# Patient Record
Sex: FEMALE | Race: OTHER | NOT HISPANIC OR LATINO | URBAN - METROPOLITAN AREA
[De-identification: names, ages, dates, MRNs, and addresses within clinical notes are randomized per-mention and may not be internally consistent; named-entity substitution may affect disease eponyms.]

---

## 2022-09-17 ENCOUNTER — INPATIENT (INPATIENT)
Facility: HOSPITAL | Age: 57
LOS: 3 days | Discharge: HOME | End: 2022-09-21
Attending: INTERNAL MEDICINE | Admitting: INTERNAL MEDICINE

## 2022-09-17 VITALS
RESPIRATION RATE: 18 BRPM | DIASTOLIC BLOOD PRESSURE: 68 MMHG | TEMPERATURE: 100 F | OXYGEN SATURATION: 100 % | SYSTOLIC BLOOD PRESSURE: 129 MMHG | HEART RATE: 94 BPM

## 2022-09-17 LAB
ALBUMIN SERPL ELPH-MCNC: 4 G/DL — SIGNIFICANT CHANGE UP (ref 3.3–5)
ALP SERPL-CCNC: 146 U/L — HIGH (ref 40–120)
ALT FLD-CCNC: 33 U/L — SIGNIFICANT CHANGE UP (ref 4–33)
ANION GAP SERPL CALC-SCNC: 21 MMOL/L — HIGH (ref 7–14)
APPEARANCE UR: CLEAR — SIGNIFICANT CHANGE UP
AST SERPL-CCNC: 46 U/L — HIGH (ref 4–32)
BACTERIA # UR AUTO: ABNORMAL
BASE EXCESS BLDV CALC-SCNC: -7.4 MMOL/L — LOW (ref -2–3)
BASOPHILS # BLD AUTO: 0.02 K/UL — SIGNIFICANT CHANGE UP (ref 0–0.2)
BASOPHILS NFR BLD AUTO: 0.1 % — SIGNIFICANT CHANGE UP (ref 0–2)
BILIRUB SERPL-MCNC: 0.3 MG/DL — SIGNIFICANT CHANGE UP (ref 0.2–1.2)
BILIRUB UR-MCNC: NEGATIVE — SIGNIFICANT CHANGE UP
BUN SERPL-MCNC: 25 MG/DL — HIGH (ref 7–23)
CA-I SERPL-SCNC: 1.2 MMOL/L — SIGNIFICANT CHANGE UP (ref 1.15–1.33)
CALCIUM SERPL-MCNC: 10 MG/DL — SIGNIFICANT CHANGE UP (ref 8.4–10.5)
CHLORIDE BLDV-SCNC: 94 MMOL/L — LOW (ref 96–108)
CHLORIDE SERPL-SCNC: 88 MMOL/L — LOW (ref 98–107)
CHLORIDE UR-SCNC: <20 MMOL/L — SIGNIFICANT CHANGE UP
CO2 BLDV-SCNC: 20 MMOL/L — LOW (ref 22–26)
CO2 SERPL-SCNC: 16 MMOL/L — LOW (ref 22–31)
COLOR SPEC: SIGNIFICANT CHANGE UP
CREAT SERPL-MCNC: 0.87 MG/DL — SIGNIFICANT CHANGE UP (ref 0.5–1.3)
DIFF PNL FLD: ABNORMAL
EGFR: 77 ML/MIN/1.73M2 — SIGNIFICANT CHANGE UP
EOSINOPHIL # BLD AUTO: 0.01 K/UL — SIGNIFICANT CHANGE UP (ref 0–0.5)
EOSINOPHIL NFR BLD AUTO: 0.1 % — SIGNIFICANT CHANGE UP (ref 0–6)
EPI CELLS # UR: 0 /HPF — SIGNIFICANT CHANGE UP (ref 0–5)
FLUAV AG NPH QL: SIGNIFICANT CHANGE UP
FLUBV AG NPH QL: SIGNIFICANT CHANGE UP
GAS PNL BLDV: 125 MMOL/L — LOW (ref 136–145)
GAS PNL BLDV: SIGNIFICANT CHANGE UP
GAS PNL BLDV: SIGNIFICANT CHANGE UP
GLUCOSE BLDV-MCNC: 134 MG/DL — HIGH (ref 70–99)
GLUCOSE SERPL-MCNC: 169 MG/DL — HIGH (ref 70–99)
GLUCOSE UR QL: ABNORMAL
HCO3 BLDV-SCNC: 19 MMOL/L — LOW (ref 22–29)
HCT VFR BLD CALC: 34 % — LOW (ref 34.5–45)
HCT VFR BLDA CALC: 32 % — LOW (ref 34.5–46.5)
HGB BLD CALC-MCNC: 10.7 G/DL — LOW (ref 11.5–15.5)
HGB BLD-MCNC: 11.1 G/DL — LOW (ref 11.5–15.5)
HYALINE CASTS # UR AUTO: 2 /LPF — SIGNIFICANT CHANGE UP (ref 0–7)
IANC: 11.47 K/UL — HIGH (ref 1.8–7.4)
IMM GRANULOCYTES NFR BLD AUTO: 0.3 % — SIGNIFICANT CHANGE UP (ref 0–0.9)
KETONES UR-MCNC: ABNORMAL
LACTATE BLDV-MCNC: 1.5 MMOL/L — SIGNIFICANT CHANGE UP (ref 0.5–2)
LEUKOCYTE ESTERASE UR-ACNC: ABNORMAL
LIDOCAIN IGE QN: 58 U/L — SIGNIFICANT CHANGE UP (ref 7–60)
LYMPHOCYTES # BLD AUTO: 1.57 K/UL — SIGNIFICANT CHANGE UP (ref 1–3.3)
LYMPHOCYTES # BLD AUTO: 10.9 % — LOW (ref 13–44)
MCHC RBC-ENTMCNC: 25 PG — LOW (ref 27–34)
MCHC RBC-ENTMCNC: 32.6 GM/DL — SIGNIFICANT CHANGE UP (ref 32–36)
MCV RBC AUTO: 76.6 FL — LOW (ref 80–100)
MONOCYTES # BLD AUTO: 1.35 K/UL — HIGH (ref 0–0.9)
MONOCYTES NFR BLD AUTO: 9.3 % — SIGNIFICANT CHANGE UP (ref 2–14)
NEUTROPHILS # BLD AUTO: 11.47 K/UL — HIGH (ref 1.8–7.4)
NEUTROPHILS NFR BLD AUTO: 79.3 % — HIGH (ref 43–77)
NITRITE UR-MCNC: POSITIVE
NRBC # BLD: 0 /100 WBCS — SIGNIFICANT CHANGE UP (ref 0–0)
NRBC # FLD: 0 K/UL — SIGNIFICANT CHANGE UP (ref 0–0)
OSMOLALITY UR: 416 MOSM/KG — SIGNIFICANT CHANGE UP (ref 50–1200)
PCO2 BLDV: 40 MMHG — SIGNIFICANT CHANGE UP (ref 39–42)
PH BLDV: 7.28 — LOW (ref 7.32–7.43)
PH UR: 6 — SIGNIFICANT CHANGE UP (ref 5–8)
PLATELET # BLD AUTO: 310 K/UL — SIGNIFICANT CHANGE UP (ref 150–400)
PO2 BLDV: 27 MMHG — SIGNIFICANT CHANGE UP
POTASSIUM BLDV-SCNC: 4.1 MMOL/L — SIGNIFICANT CHANGE UP (ref 3.5–5.1)
POTASSIUM SERPL-MCNC: 4.5 MMOL/L — SIGNIFICANT CHANGE UP (ref 3.5–5.3)
POTASSIUM SERPL-SCNC: 4.5 MMOL/L — SIGNIFICANT CHANGE UP (ref 3.5–5.3)
POTASSIUM UR-SCNC: 21.5 MMOL/L — SIGNIFICANT CHANGE UP
PROT SERPL-MCNC: 8.4 G/DL — HIGH (ref 6–8.3)
PROT UR-MCNC: ABNORMAL
RBC # BLD: 4.44 M/UL — SIGNIFICANT CHANGE UP (ref 3.8–5.2)
RBC # FLD: 13.4 % — SIGNIFICANT CHANGE UP (ref 10.3–14.5)
RBC CASTS # UR COMP ASSIST: 17 /HPF — HIGH (ref 0–4)
RSV RNA NPH QL NAA+NON-PROBE: SIGNIFICANT CHANGE UP
SAO2 % BLDV: 36.3 % — SIGNIFICANT CHANGE UP
SARS-COV-2 RNA SPEC QL NAA+PROBE: SIGNIFICANT CHANGE UP
SODIUM SERPL-SCNC: 125 MMOL/L — LOW (ref 135–145)
SODIUM UR-SCNC: <20 MMOL/L — SIGNIFICANT CHANGE UP
SP GR SPEC: 1.02 — SIGNIFICANT CHANGE UP (ref 1.01–1.05)
TROPONIN T, HIGH SENSITIVITY RESULT: 11 NG/L — SIGNIFICANT CHANGE UP
TROPONIN T, HIGH SENSITIVITY RESULT: 8 NG/L — SIGNIFICANT CHANGE UP
UROBILINOGEN FLD QL: SIGNIFICANT CHANGE UP
WBC # BLD: 14.46 K/UL — HIGH (ref 3.8–10.5)
WBC # FLD AUTO: 14.46 K/UL — HIGH (ref 3.8–10.5)
WBC UR QL: 23 /HPF — HIGH (ref 0–5)

## 2022-09-17 PROCEDURE — 71046 X-RAY EXAM CHEST 2 VIEWS: CPT | Mod: 26

## 2022-09-17 PROCEDURE — 99285 EMERGENCY DEPT VISIT HI MDM: CPT

## 2022-09-17 RX ORDER — SODIUM CHLORIDE 9 MG/ML
1000 INJECTION INTRAMUSCULAR; INTRAVENOUS; SUBCUTANEOUS ONCE
Refills: 0 | Status: COMPLETED | OUTPATIENT
Start: 2022-09-17 | End: 2022-09-17

## 2022-09-17 RX ORDER — SODIUM CHLORIDE 9 MG/ML
1000 INJECTION, SOLUTION INTRAVENOUS ONCE
Refills: 0 | Status: COMPLETED | OUTPATIENT
Start: 2022-09-17 | End: 2022-09-17

## 2022-09-17 RX ORDER — FAMOTIDINE 10 MG/ML
20 INJECTION INTRAVENOUS ONCE
Refills: 0 | Status: COMPLETED | OUTPATIENT
Start: 2022-09-17 | End: 2022-09-17

## 2022-09-17 RX ORDER — ONDANSETRON 8 MG/1
4 TABLET, FILM COATED ORAL ONCE
Refills: 0 | Status: COMPLETED | OUTPATIENT
Start: 2022-09-17 | End: 2022-09-17

## 2022-09-17 RX ORDER — ACETAMINOPHEN 500 MG
975 TABLET ORAL ONCE
Refills: 0 | Status: COMPLETED | OUTPATIENT
Start: 2022-09-17 | End: 2022-09-17

## 2022-09-17 RX ORDER — GABAPENTIN 400 MG/1
100 CAPSULE ORAL ONCE
Refills: 0 | Status: COMPLETED | OUTPATIENT
Start: 2022-09-17 | End: 2022-09-17

## 2022-09-17 RX ORDER — LIDOCAINE 4 G/100G
10 CREAM TOPICAL ONCE
Refills: 0 | Status: COMPLETED | OUTPATIENT
Start: 2022-09-17 | End: 2022-09-17

## 2022-09-17 RX ORDER — SODIUM CHLORIDE 9 MG/ML
1000 INJECTION INTRAMUSCULAR; INTRAVENOUS; SUBCUTANEOUS ONCE
Refills: 0 | Status: DISCONTINUED | OUTPATIENT
Start: 2022-09-17 | End: 2022-09-17

## 2022-09-17 RX ADMIN — SODIUM CHLORIDE 1000 MILLILITER(S): 9 INJECTION INTRAMUSCULAR; INTRAVENOUS; SUBCUTANEOUS at 23:37

## 2022-09-17 RX ADMIN — SODIUM CHLORIDE 1000 MILLILITER(S): 9 INJECTION INTRAMUSCULAR; INTRAVENOUS; SUBCUTANEOUS at 20:34

## 2022-09-17 RX ADMIN — Medication 30 MILLILITER(S): at 20:33

## 2022-09-17 RX ADMIN — LIDOCAINE 10 MILLILITER(S): 4 CREAM TOPICAL at 20:33

## 2022-09-17 RX ADMIN — SODIUM CHLORIDE 1000 MILLILITER(S): 9 INJECTION, SOLUTION INTRAVENOUS at 23:23

## 2022-09-17 RX ADMIN — ONDANSETRON 4 MILLIGRAM(S): 8 TABLET, FILM COATED ORAL at 20:34

## 2022-09-17 RX ADMIN — GABAPENTIN 100 MILLIGRAM(S): 400 CAPSULE ORAL at 20:33

## 2022-09-17 RX ADMIN — Medication 975 MILLIGRAM(S): at 23:36

## 2022-09-17 RX ADMIN — FAMOTIDINE 20 MILLIGRAM(S): 10 INJECTION INTRAVENOUS at 20:34

## 2022-09-17 NOTE — ED PROVIDER NOTE - OBJECTIVE STATEMENT
Pt is 56 y/o female w/ PMH DM (on metoformin), CAD s/p stent x2 (on aspirin) presenting to ED c/o chills, pain in right leg, and stomach pain since yesterday. She states because of stomach pain, she was unable to normal amount of food and drink; reports difficulty swallowing, feeling of food stuck. Denies recent travel and sick contact. Denies fever, nausea, and diarrhea but admits to NBNB emesis x2 w/ last emesis at 8 am this morning.    Reports allergy to aspirin (hive). Denies smoking, drinking, and illicit drug usage.  Fam Hx significant for DM in father (50), and HTN in mother. Pt is 58 y/o female w/ PMH DM (on metformin), CAD s/p stent x2 (on aspirin) presenting to ED c/o chills, epigastric pain since yesterday. She states because of stomach pain, she was unable to normal amount of food and drink; reports difficulty swallowing, feeling of food stuck. Denies recent travel and sick contact. Denies fever, nausea, and diarrhea but admits to NBNB emesis x2 w/ last emesis at 8 am this morning.    Reports allergy to aspirin (hive). Denies smoking, drinking, and illicit drug usage.  Fam Hx significant for DM in father (50), and HTN in mother.

## 2022-09-17 NOTE — ED PROVIDER NOTE - PROGRESS NOTE DETAILS
Na 125, oral temp 100.5, UA consistent with UTI. treat pt with fluids, ceftriaxone. admit to hospitalist for sepsis w/u.

## 2022-09-17 NOTE — ED PROVIDER NOTE - CLINICAL SUMMARY MEDICAL DECISION MAKING FREE TEXT BOX
56 y/o female c/o chills, pain in right leg, and stomach pain since yesterday.    Concern for viral gastroenteritis.  Will order labs, including CBC, CMP, UA/UCx, and order COVID/RVP.  Will order CXR to r/o pneumonia.  Will provide symptomatic care PRN.  Will reassess. 56 y/o female c/o chills, pain in right leg, and stomach pain since yesterday.    Concern for viral gastroenteritis. given h/o stents, will get ACS w/u.  Will order labs, including CBC, CMP, UA/UCx, and order COVID/RVP.  Will order CXR to r/o pneumonia.  Will provide symptomatic care PRN.  Will reassess.

## 2022-09-17 NOTE — ED PROVIDER NOTE - ATTENDING APP SHARED VISIT CONTRIBUTION OF CARE
DR. VUONG, ATTENDING MD-  I personally saw the patient with the PA and performed a substantive portion of the visit including all aspects of the medical decision making.    56 y/o female h/o dm cardiac stent x2 p/w fatigue, "choking" sensation while eating, cp, n/v x2 days.  Eval for atypical acs, anemia, lyte abn, viral syndrome.  Obtain cbc cmp trop lipase ekg cxr ua ucx covid swab give ivf bolus antiemetic reassess.

## 2022-09-17 NOTE — ED ADULT TRIAGE NOTE - CHIEF COMPLAINT QUOTE
c/o midsternal c/p, chills, and episodes of vomiting, onset 2 days ago worse today, denies SOB, CULVER, hx of DM and 2 stents placement.

## 2022-09-17 NOTE — ED ADULT NURSE NOTE - OBJECTIVE STATEMENT
pt aox4, from home, comes in for persistent reflux like symptoms with sore throat, nausea, vomiting at home, with chills and generalized malaise with right leg pain. pt denies chest pain, no sob, no headache. no swelling to extremities. right hand20 g inserted with labs drawn. stretcher in lowest position.

## 2022-09-17 NOTE — ED PROVIDER NOTE - CARE PLAN
1 Principal Discharge DX:	Hyponatremia   Principal Discharge DX:	Hyponatremia  Secondary Diagnosis:	Acute UTI

## 2022-09-18 DIAGNOSIS — A41.9 SEPSIS, UNSPECIFIED ORGANISM: ICD-10-CM

## 2022-09-18 DIAGNOSIS — Z95.5 PRESENCE OF CORONARY ANGIOPLASTY IMPLANT AND GRAFT: Chronic | ICD-10-CM

## 2022-09-18 DIAGNOSIS — I25.10 ATHEROSCLEROTIC HEART DISEASE OF NATIVE CORONARY ARTERY WITHOUT ANGINA PECTORIS: ICD-10-CM

## 2022-09-18 DIAGNOSIS — R13.10 DYSPHAGIA, UNSPECIFIED: ICD-10-CM

## 2022-09-18 DIAGNOSIS — R13.19 OTHER DYSPHAGIA: ICD-10-CM

## 2022-09-18 DIAGNOSIS — E11.9 TYPE 2 DIABETES MELLITUS WITHOUT COMPLICATIONS: ICD-10-CM

## 2022-09-18 DIAGNOSIS — E87.1 HYPO-OSMOLALITY AND HYPONATREMIA: ICD-10-CM

## 2022-09-18 DIAGNOSIS — E87.2 ACIDOSIS: ICD-10-CM

## 2022-09-18 DIAGNOSIS — Z29.9 ENCOUNTER FOR PROPHYLACTIC MEASURES, UNSPECIFIED: ICD-10-CM

## 2022-09-18 DIAGNOSIS — D50.9 IRON DEFICIENCY ANEMIA, UNSPECIFIED: ICD-10-CM

## 2022-09-18 LAB
A1C WITH ESTIMATED AVERAGE GLUCOSE RESULT: 7.7 % — HIGH (ref 4–5.6)
ALBUMIN SERPL ELPH-MCNC: 3.2 G/DL — LOW (ref 3.3–5)
ALP SERPL-CCNC: 130 U/L — HIGH (ref 40–120)
ALT FLD-CCNC: 31 U/L — SIGNIFICANT CHANGE UP (ref 4–33)
ANION GAP SERPL CALC-SCNC: 13 MMOL/L — SIGNIFICANT CHANGE UP (ref 7–14)
ANION GAP SERPL CALC-SCNC: 14 MMOL/L — SIGNIFICANT CHANGE UP (ref 7–14)
ANION GAP SERPL CALC-SCNC: 16 MMOL/L — HIGH (ref 7–14)
ANION GAP SERPL CALC-SCNC: 17 MMOL/L — HIGH (ref 7–14)
AST SERPL-CCNC: 37 U/L — HIGH (ref 4–32)
B-OH-BUTYR SERPL-SCNC: 2.3 MMOL/L — HIGH (ref 0–0.4)
B-OH-BUTYR SERPL-SCNC: 2.8 MMOL/L — HIGH (ref 0–0.4)
BASE EXCESS BLDV CALC-SCNC: -4.7 MMOL/L — LOW (ref -2–3)
BILIRUB SERPL-MCNC: 0.2 MG/DL — SIGNIFICANT CHANGE UP (ref 0.2–1.2)
BLOOD GAS VENOUS COMPREHENSIVE RESULT: SIGNIFICANT CHANGE UP
BLOOD GAS VENOUS COMPREHENSIVE RESULT: SIGNIFICANT CHANGE UP
BUN SERPL-MCNC: 12 MG/DL — SIGNIFICANT CHANGE UP (ref 7–23)
BUN SERPL-MCNC: 13 MG/DL — SIGNIFICANT CHANGE UP (ref 7–23)
BUN SERPL-MCNC: 16 MG/DL — SIGNIFICANT CHANGE UP (ref 7–23)
BUN SERPL-MCNC: 20 MG/DL — SIGNIFICANT CHANGE UP (ref 7–23)
CALCIUM SERPL-MCNC: 8.5 MG/DL — SIGNIFICANT CHANGE UP (ref 8.4–10.5)
CALCIUM SERPL-MCNC: 8.8 MG/DL — SIGNIFICANT CHANGE UP (ref 8.4–10.5)
CALCIUM SERPL-MCNC: 8.9 MG/DL — SIGNIFICANT CHANGE UP (ref 8.4–10.5)
CALCIUM SERPL-MCNC: 9.1 MG/DL — SIGNIFICANT CHANGE UP (ref 8.4–10.5)
CHLORIDE BLDV-SCNC: 94 MMOL/L — LOW (ref 96–108)
CHLORIDE SERPL-SCNC: 93 MMOL/L — LOW (ref 98–107)
CHLORIDE SERPL-SCNC: 94 MMOL/L — LOW (ref 98–107)
CHLORIDE SERPL-SCNC: 95 MMOL/L — LOW (ref 98–107)
CHLORIDE SERPL-SCNC: 95 MMOL/L — LOW (ref 98–107)
CO2 BLDV-SCNC: 19.5 MMOL/L — LOW (ref 22–26)
CO2 SERPL-SCNC: 15 MMOL/L — LOW (ref 22–31)
CO2 SERPL-SCNC: 17 MMOL/L — LOW (ref 22–31)
CO2 SERPL-SCNC: 19 MMOL/L — LOW (ref 22–31)
CO2 SERPL-SCNC: 19 MMOL/L — LOW (ref 22–31)
CREAT SERPL-MCNC: 0.66 MG/DL — SIGNIFICANT CHANGE UP (ref 0.5–1.3)
CREAT SERPL-MCNC: 0.7 MG/DL — SIGNIFICANT CHANGE UP (ref 0.5–1.3)
CREAT SERPL-MCNC: 0.72 MG/DL — SIGNIFICANT CHANGE UP (ref 0.5–1.3)
CREAT SERPL-MCNC: 0.73 MG/DL — SIGNIFICANT CHANGE UP (ref 0.5–1.3)
EGFR: 100 ML/MIN/1.73M2 — SIGNIFICANT CHANGE UP
EGFR: 101 ML/MIN/1.73M2 — SIGNIFICANT CHANGE UP
EGFR: 95 ML/MIN/1.73M2 — SIGNIFICANT CHANGE UP
EGFR: 96 ML/MIN/1.73M2 — SIGNIFICANT CHANGE UP
ESTIMATED AVERAGE GLUCOSE: 174 — SIGNIFICANT CHANGE UP
FERRITIN SERPL-MCNC: 369 NG/ML — HIGH (ref 15–150)
GAS PNL BLDV: 127 MMOL/L — LOW (ref 136–145)
GLUCOSE BLDC GLUCOMTR-MCNC: 124 MG/DL — HIGH (ref 70–99)
GLUCOSE BLDC GLUCOMTR-MCNC: 130 MG/DL — HIGH (ref 70–99)
GLUCOSE BLDC GLUCOMTR-MCNC: 136 MG/DL — HIGH (ref 70–99)
GLUCOSE BLDC GLUCOMTR-MCNC: 163 MG/DL — HIGH (ref 70–99)
GLUCOSE BLDC GLUCOMTR-MCNC: 173 MG/DL — HIGH (ref 70–99)
GLUCOSE BLDV-MCNC: 125 MG/DL — HIGH (ref 70–99)
GLUCOSE SERPL-MCNC: 122 MG/DL — HIGH (ref 70–99)
GLUCOSE SERPL-MCNC: 129 MG/DL — HIGH (ref 70–99)
GLUCOSE SERPL-MCNC: 146 MG/DL — HIGH (ref 70–99)
GLUCOSE SERPL-MCNC: 183 MG/DL — HIGH (ref 70–99)
HCO3 BLDV-SCNC: 19 MMOL/L — LOW (ref 22–29)
HCT VFR BLD CALC: 31.1 % — LOW (ref 34.5–45)
HCT VFR BLDA CALC: 32 % — LOW (ref 34.5–46.5)
HGB BLD CALC-MCNC: 10.6 G/DL — LOW (ref 11.5–15.5)
HGB BLD-MCNC: 10.1 G/DL — LOW (ref 11.5–15.5)
IRON SATN MFR SERPL: 17 UG/DL — LOW (ref 30–160)
IRON SATN MFR SERPL: 8 % — LOW (ref 14–50)
LACTATE BLDV-MCNC: 0.8 MMOL/L — SIGNIFICANT CHANGE UP (ref 0.5–2)
MAGNESIUM SERPL-MCNC: 1.9 MG/DL — SIGNIFICANT CHANGE UP (ref 1.6–2.6)
MAGNESIUM SERPL-MCNC: 2 MG/DL — SIGNIFICANT CHANGE UP (ref 1.6–2.6)
MAGNESIUM SERPL-MCNC: 2.1 MG/DL — SIGNIFICANT CHANGE UP (ref 1.6–2.6)
MCHC RBC-ENTMCNC: 24.4 PG — LOW (ref 27–34)
MCHC RBC-ENTMCNC: 32.5 GM/DL — SIGNIFICANT CHANGE UP (ref 32–36)
MCV RBC AUTO: 75.1 FL — LOW (ref 80–100)
NRBC # BLD: 0 /100 WBCS — SIGNIFICANT CHANGE UP (ref 0–0)
NRBC # FLD: 0 K/UL — SIGNIFICANT CHANGE UP (ref 0–0)
OSMOLALITY SERPL: 283 MOSM/KG — SIGNIFICANT CHANGE UP (ref 275–295)
PCO2 BLDV: 28 MMHG — LOW (ref 39–42)
PH BLDV: 7.43 — SIGNIFICANT CHANGE UP (ref 7.32–7.43)
PHOSPHATE SERPL-MCNC: 1.5 MG/DL — LOW (ref 2.5–4.5)
PHOSPHATE SERPL-MCNC: 1.9 MG/DL — LOW (ref 2.5–4.5)
PHOSPHATE SERPL-MCNC: 2.9 MG/DL — SIGNIFICANT CHANGE UP (ref 2.5–4.5)
PLATELET # BLD AUTO: 271 K/UL — SIGNIFICANT CHANGE UP (ref 150–400)
PO2 BLDV: 54 MMHG — SIGNIFICANT CHANGE UP
POTASSIUM BLDV-SCNC: 3.7 MMOL/L — SIGNIFICANT CHANGE UP (ref 3.5–5.1)
POTASSIUM SERPL-MCNC: 3.6 MMOL/L — SIGNIFICANT CHANGE UP (ref 3.5–5.3)
POTASSIUM SERPL-MCNC: 3.7 MMOL/L — SIGNIFICANT CHANGE UP (ref 3.5–5.3)
POTASSIUM SERPL-MCNC: 3.9 MMOL/L — SIGNIFICANT CHANGE UP (ref 3.5–5.3)
POTASSIUM SERPL-MCNC: 4 MMOL/L — SIGNIFICANT CHANGE UP (ref 3.5–5.3)
POTASSIUM SERPL-SCNC: 3.6 MMOL/L — SIGNIFICANT CHANGE UP (ref 3.5–5.3)
POTASSIUM SERPL-SCNC: 3.7 MMOL/L — SIGNIFICANT CHANGE UP (ref 3.5–5.3)
POTASSIUM SERPL-SCNC: 3.9 MMOL/L — SIGNIFICANT CHANGE UP (ref 3.5–5.3)
POTASSIUM SERPL-SCNC: 4 MMOL/L — SIGNIFICANT CHANGE UP (ref 3.5–5.3)
PROCALCITONIN SERPL-MCNC: 4.7 NG/ML — HIGH (ref 0.02–0.1)
PROT SERPL-MCNC: 6.8 G/DL — SIGNIFICANT CHANGE UP (ref 6–8.3)
RBC # BLD: 3.87 M/UL — SIGNIFICANT CHANGE UP (ref 3.8–5.2)
RBC # BLD: 4.14 M/UL — SIGNIFICANT CHANGE UP (ref 3.8–5.2)
RBC # FLD: 13.7 % — SIGNIFICANT CHANGE UP (ref 10.3–14.5)
RETICS #: 49.5 K/UL — SIGNIFICANT CHANGE UP (ref 25–125)
RETICS/RBC NFR: 1.3 % — SIGNIFICANT CHANGE UP (ref 0.5–2.5)
SAO2 % BLDV: 87.7 % — SIGNIFICANT CHANGE UP
SODIUM SERPL-SCNC: 125 MMOL/L — LOW (ref 135–145)
SODIUM SERPL-SCNC: 126 MMOL/L — LOW (ref 135–145)
SODIUM SERPL-SCNC: 128 MMOL/L — LOW (ref 135–145)
SODIUM SERPL-SCNC: 128 MMOL/L — LOW (ref 135–145)
TIBC SERPL-MCNC: 210 UG/DL — LOW (ref 220–430)
UIBC SERPL-MCNC: 193 UG/DL — SIGNIFICANT CHANGE UP (ref 110–370)
WBC # BLD: 9.5 K/UL — SIGNIFICANT CHANGE UP (ref 3.8–10.5)
WBC # FLD AUTO: 9.5 K/UL — SIGNIFICANT CHANGE UP (ref 3.8–10.5)

## 2022-09-18 PROCEDURE — 12345: CPT | Mod: NC,GC

## 2022-09-18 PROCEDURE — 99223 1ST HOSP IP/OBS HIGH 75: CPT | Mod: GC

## 2022-09-18 PROCEDURE — 99223 1ST HOSP IP/OBS HIGH 75: CPT

## 2022-09-18 RX ORDER — DEXTROSE 50 % IN WATER 50 %
25 SYRINGE (ML) INTRAVENOUS ONCE
Refills: 0 | Status: DISCONTINUED | OUTPATIENT
Start: 2022-09-18 | End: 2022-09-21

## 2022-09-18 RX ORDER — SODIUM,POTASSIUM PHOSPHATES 278-250MG
1 POWDER IN PACKET (EA) ORAL ONCE
Refills: 0 | Status: COMPLETED | OUTPATIENT
Start: 2022-09-18 | End: 2022-09-18

## 2022-09-18 RX ORDER — DEXTROSE 50 % IN WATER 50 %
12.5 SYRINGE (ML) INTRAVENOUS ONCE
Refills: 0 | Status: DISCONTINUED | OUTPATIENT
Start: 2022-09-18 | End: 2022-09-21

## 2022-09-18 RX ORDER — LANOLIN ALCOHOL/MO/W.PET/CERES
3 CREAM (GRAM) TOPICAL AT BEDTIME
Refills: 0 | Status: DISCONTINUED | OUTPATIENT
Start: 2022-09-18 | End: 2022-09-21

## 2022-09-18 RX ORDER — ROSUVASTATIN CALCIUM 5 MG/1
1 TABLET ORAL
Qty: 0 | Refills: 0 | DISCHARGE

## 2022-09-18 RX ORDER — ATORVASTATIN CALCIUM 80 MG/1
80 TABLET, FILM COATED ORAL AT BEDTIME
Refills: 0 | Status: DISCONTINUED | OUTPATIENT
Start: 2022-09-18 | End: 2022-09-21

## 2022-09-18 RX ORDER — ASPIRIN/CALCIUM CARB/MAGNESIUM 324 MG
81 TABLET ORAL DAILY
Refills: 0 | Status: DISCONTINUED | OUTPATIENT
Start: 2022-09-18 | End: 2022-09-21

## 2022-09-18 RX ORDER — GLUCAGON INJECTION, SOLUTION 0.5 MG/.1ML
1 INJECTION, SOLUTION SUBCUTANEOUS ONCE
Refills: 0 | Status: DISCONTINUED | OUTPATIENT
Start: 2022-09-18 | End: 2022-09-21

## 2022-09-18 RX ORDER — DEXTROSE 50 % IN WATER 50 %
15 SYRINGE (ML) INTRAVENOUS ONCE
Refills: 0 | Status: DISCONTINUED | OUTPATIENT
Start: 2022-09-18 | End: 2022-09-21

## 2022-09-18 RX ORDER — ACETAMINOPHEN 500 MG
650 TABLET ORAL EVERY 6 HOURS
Refills: 0 | Status: DISCONTINUED | OUTPATIENT
Start: 2022-09-18 | End: 2022-09-21

## 2022-09-18 RX ORDER — PRASUGREL 5 MG/1
10 TABLET, FILM COATED ORAL DAILY
Refills: 0 | Status: DISCONTINUED | OUTPATIENT
Start: 2022-09-18 | End: 2022-09-21

## 2022-09-18 RX ORDER — CEFTRIAXONE 500 MG/1
1000 INJECTION, POWDER, FOR SOLUTION INTRAMUSCULAR; INTRAVENOUS EVERY 24 HOURS
Refills: 0 | Status: COMPLETED | OUTPATIENT
Start: 2022-09-18 | End: 2022-09-19

## 2022-09-18 RX ORDER — SODIUM CHLORIDE 9 MG/ML
1000 INJECTION INTRAMUSCULAR; INTRAVENOUS; SUBCUTANEOUS
Refills: 0 | Status: DISCONTINUED | OUTPATIENT
Start: 2022-09-18 | End: 2022-09-19

## 2022-09-18 RX ORDER — SENNA PLUS 8.6 MG/1
1 TABLET ORAL AT BEDTIME
Refills: 0 | Status: DISCONTINUED | OUTPATIENT
Start: 2022-09-18 | End: 2022-09-20

## 2022-09-18 RX ORDER — PANTOPRAZOLE SODIUM 20 MG/1
40 TABLET, DELAYED RELEASE ORAL
Refills: 0 | Status: DISCONTINUED | OUTPATIENT
Start: 2022-09-18 | End: 2022-09-21

## 2022-09-18 RX ORDER — SODIUM CHLORIDE 9 MG/ML
1000 INJECTION, SOLUTION INTRAVENOUS
Refills: 0 | Status: DISCONTINUED | OUTPATIENT
Start: 2022-09-18 | End: 2022-09-21

## 2022-09-18 RX ORDER — POLYETHYLENE GLYCOL 3350 17 G/17G
17 POWDER, FOR SOLUTION ORAL DAILY
Refills: 0 | Status: DISCONTINUED | OUTPATIENT
Start: 2022-09-18 | End: 2022-09-20

## 2022-09-18 RX ORDER — SODIUM CHLORIDE 9 MG/ML
1000 INJECTION INTRAMUSCULAR; INTRAVENOUS; SUBCUTANEOUS
Refills: 0 | Status: DISCONTINUED | OUTPATIENT
Start: 2022-09-18 | End: 2022-09-18

## 2022-09-18 RX ORDER — POTASSIUM PHOSPHATE, MONOBASIC POTASSIUM PHOSPHATE, DIBASIC 236; 224 MG/ML; MG/ML
30 INJECTION, SOLUTION INTRAVENOUS ONCE
Refills: 0 | Status: COMPLETED | OUTPATIENT
Start: 2022-09-18 | End: 2022-09-18

## 2022-09-18 RX ORDER — INFLUENZA VIRUS VACCINE 15; 15; 15; 15 UG/.5ML; UG/.5ML; UG/.5ML; UG/.5ML
0.5 SUSPENSION INTRAMUSCULAR ONCE
Refills: 0 | Status: DISCONTINUED | OUTPATIENT
Start: 2022-09-18 | End: 2022-09-21

## 2022-09-18 RX ORDER — INSULIN LISPRO 100/ML
VIAL (ML) SUBCUTANEOUS
Refills: 0 | Status: DISCONTINUED | OUTPATIENT
Start: 2022-09-18 | End: 2022-09-19

## 2022-09-18 RX ORDER — INSULIN LISPRO 100/ML
VIAL (ML) SUBCUTANEOUS AT BEDTIME
Refills: 0 | Status: DISCONTINUED | OUTPATIENT
Start: 2022-09-18 | End: 2022-09-19

## 2022-09-18 RX ORDER — CEFTRIAXONE 500 MG/1
1000 INJECTION, POWDER, FOR SOLUTION INTRAMUSCULAR; INTRAVENOUS ONCE
Refills: 0 | Status: COMPLETED | OUTPATIENT
Start: 2022-09-18 | End: 2022-09-18

## 2022-09-18 RX ORDER — GABAPENTIN 400 MG/1
100 CAPSULE ORAL AT BEDTIME
Refills: 0 | Status: DISCONTINUED | OUTPATIENT
Start: 2022-09-18 | End: 2022-09-21

## 2022-09-18 RX ADMIN — ATORVASTATIN CALCIUM 80 MILLIGRAM(S): 80 TABLET, FILM COATED ORAL at 21:21

## 2022-09-18 RX ADMIN — PANTOPRAZOLE SODIUM 40 MILLIGRAM(S): 20 TABLET, DELAYED RELEASE ORAL at 12:44

## 2022-09-18 RX ADMIN — SODIUM CHLORIDE 500 MILLILITER(S): 9 INJECTION INTRAMUSCULAR; INTRAVENOUS; SUBCUTANEOUS at 00:08

## 2022-09-18 RX ADMIN — CEFTRIAXONE 100 MILLIGRAM(S): 500 INJECTION, POWDER, FOR SOLUTION INTRAMUSCULAR; INTRAVENOUS at 00:33

## 2022-09-18 RX ADMIN — GABAPENTIN 100 MILLIGRAM(S): 400 CAPSULE ORAL at 21:23

## 2022-09-18 RX ADMIN — POLYETHYLENE GLYCOL 3350 17 GRAM(S): 17 POWDER, FOR SOLUTION ORAL at 12:39

## 2022-09-18 RX ADMIN — Medication 1 PACKET(S): at 06:58

## 2022-09-18 RX ADMIN — Medication 650 MILLIGRAM(S): at 18:11

## 2022-09-18 RX ADMIN — Medication 1: at 12:39

## 2022-09-18 RX ADMIN — SODIUM CHLORIDE 125 MILLILITER(S): 9 INJECTION INTRAMUSCULAR; INTRAVENOUS; SUBCUTANEOUS at 20:45

## 2022-09-18 RX ADMIN — SENNA PLUS 1 TABLET(S): 8.6 TABLET ORAL at 21:21

## 2022-09-18 RX ADMIN — SODIUM CHLORIDE 75 MILLILITER(S): 9 INJECTION INTRAMUSCULAR; INTRAVENOUS; SUBCUTANEOUS at 19:27

## 2022-09-18 RX ADMIN — Medication 81 MILLIGRAM(S): at 12:39

## 2022-09-18 RX ADMIN — POTASSIUM PHOSPHATE, MONOBASIC POTASSIUM PHOSPHATE, DIBASIC 83.33 MILLIMOLE(S): 236; 224 INJECTION, SOLUTION INTRAVENOUS at 19:27

## 2022-09-18 RX ADMIN — Medication 650 MILLIGRAM(S): at 18:41

## 2022-09-18 RX ADMIN — PRASUGREL 10 MILLIGRAM(S): 5 TABLET, FILM COATED ORAL at 12:40

## 2022-09-18 RX ADMIN — Medication 63.75 MILLIMOLE(S): at 03:28

## 2022-09-18 RX ADMIN — CEFTRIAXONE 100 MILLIGRAM(S): 500 INJECTION, POWDER, FOR SOLUTION INTRAMUSCULAR; INTRAVENOUS at 23:52

## 2022-09-18 NOTE — PROGRESS NOTE ADULT - PROBLEM SELECTOR PLAN 1
Temp 100.5, WBC 14.5 + positive UA. No hx of UTIs previously. No other source suspected.    - CTX continue  - UCx, BCx, procal >> f/u  - Fluids: 3L fluids given in ED, hemodynamically stable Temp 100.5, WBC 14.5 + positive UA. No hx of UTIs previously. No other source suspected.  - c/w ceftriaxone x 3 days   - pending urine culture  - procal elevated suggesting bacterial etiology consistent with UTI  - blood cultures pending  - CXR clear lungs

## 2022-09-18 NOTE — PROGRESS NOTE ADULT - PROBLEM SELECTOR PLAN 7
- Fluids: PO  - Electrolytes: Will replete to maintain K>4, Phos>3, and Mag>2  - Nutrition: clear liquids, AAT  - Activity: OOB as tolerated  - DVT Prophylaxis: prasugrel (home med)  - Stress Ulcer/GI Prophylaxis: NA  - Disposition: pending medical management - PCI 1 month ago at [facility] - 2 stents placed (NSTEMI)  - C/w asa/statin  - C/w prasugrel 10mg qd

## 2022-09-18 NOTE — H&P ADULT - ATTENDING COMMENTS
58 yo f with h/o dm, cad s/p recent PCI presenting with 2 days of sensation of solid food content unable to pass through stomach.  Tolerating liquids, + gas and stool since symptom onset. UA +, Na 125, [place] <20. CT abd ordered, gi emailed. c/w IVF, trend Na, ceftriaxone for UTI. Liquid diet, advance as tolerated

## 2022-09-18 NOTE — CONSULT NOTE ADULT - ATTENDING COMMENTS
56 yo with h/o DM, CAD on DAPT for stents last month, presented with c/o dysphagia, vomiting, loss of appetite, started treatment with SGLT2 3 weeks ago, on presentation, 's with AG 21, given 3L crystalloid after which her AG normalized.  Today her AG increased to 17 with BHB 2.8 and concern was for euglycemic DKA.  She has not eaten much nor drunk fluids since admission 2/2 dysphagia.  She has not received IVF since 9/17.  Abd pain has subsided and she was treated for UTI with ceftriaxone.  On exam she is awake, alert, conversant, in NAD, well appearing.  Claims she is thirsty but cannot tolerate drinking.  Lungs are clear abd soft and NT, no periph edema.    Elevated BHB: Mild Euglycemic DKA vs starvation ketosis 56 yo with h/o DM, CAD on DAPT for stents last month, presented with c/o dysphagia, vomiting, loss of appetite, started treatment with SGLT2 3 weeks ago, on presentation, 's with AG 21, given 3L crystalloid after which her AG normalized.  Today her AG increased to 17 with BHB 2.8 and concern was for euglycemic DKA.  She has not eaten much nor drunk fluids since admission 2/2 dysphagia.  She has not received IVF since 9/17.  Abd pain has subsided and she was treated for UTI with ceftriaxone.  On exam she is awake, alert, conversant, in NAD, well appearing.  Claims she is thirsty but cannot tolerate drinking.  Lungs are clear abd soft and NT, no periph edema.    Elevated BHB: Mild Euglycemic DKA vs starvation ketosis vs both  - agree with ivf 125/hr   - FS, BMP with BHB q4h  - She does not require MICU level of care at this time; if labs or pt worsen please reconsult

## 2022-09-18 NOTE — PROGRESS NOTE ADULT - PROBLEM SELECTOR PLAN 2
Na 125 on admission, improved to 126 after 2L fluids. Given hx, suspect hypovolemic hyponatremia.    - BMP q12h >> goal correction 6-8 mEqs in 24hrs  - Sosm, Uosm, Asmita >> f/u Pt describes liquids/solids getting stuck in her stomach, new; denies difficulty initiating swallowing reflex. Also endorsing symptoms of acid reflux. No hx of smoking/EtOH or GERD/h. pylori, significant weight loss. Likely 2/2 GERD vs gastroparesis although less likely with new diabetes dx and HbA1c 7.7.  - pending CT abdomen  - started on pantoprazole 40 QD  - Liquid diet for now >> advance as tolerated  - GI consult

## 2022-09-18 NOTE — PROGRESS NOTE ADULT - PROBLEM SELECTOR PLAN 5
Pt describes liquids/solids getting stuck in her stomach, new; denies difficulty initiating swallowing reflex. No hx of smoking/EtOH or GERD/h. pylori, significant weight loss. +T2DM, urine glucose >1000, recent d/c paperwork recommended starting insulin. Paresis?    - CT-Abdomen  - Liquid diet for now >> advance as tolerated  - GI consult Hgb 11.1, MCV 76.6. Likely a mixed picture on chronic disease and iron deficiency.  - Iron/TIBC low suggesting iron deficiency anemia; can consider iron supplementation OP  - elevated ferritin likely in the setting on chronic illness  - will need to evaluate if patient is up to date on age appropriate cancer screening with colonoscopy and Pap

## 2022-09-18 NOTE — H&P ADULT - NSHPPHYSICALEXAM_GEN_ALL_CORE
VITALS:   T(C): 38.1 (09-17-22 @ 23:29), Max: 38.1 (09-17-22 @ 23:29)  HR: 86 (09-17-22 @ 23:29) (86 - 94)  BP: 148/71 (09-17-22 @ 23:29) (129/68 - 148/71)  RR: 17 (09-17-22 @ 23:29) (17 - 18)  SpO2: 100% (09-17-22 @ 23:29) (100% - 100%)    GENERAL: NAD, lying in bed comfortably  HEAD:  Atraumatic, Normocephalic  EYES: EOMI, PERRLA, conjunctiva and sclera clear  ENT: Moist mucous membranes  NECK: Supple, No JVD  CHEST/LUNG: Clear to auscultation bilaterally; No rales, rhonchi, wheezing, or rubs. Unlabored respirations  HEART: Regular rate and rhythm; No murmurs, rubs, or gallops  ABDOMEN: BSx4; Soft, nontender, nondistended  EXTREMITIES:  2+ Peripheral Pulses, brisk capillary refill. No clubbing, cyanosis, or edema  NERVOUS SYSTEM:  A&Ox3, no focal deficits   SKIN: No rashes or lesions  Psych: Normal speech, normal behavior, normal affect VITALS:   T(C): 38.1 (09-17-22 @ 23:29), Max: 38.1 (09-17-22 @ 23:29)  HR: 86 (09-17-22 @ 23:29) (86 - 94)  BP: 148/71 (09-17-22 @ 23:29) (129/68 - 148/71)  RR: 17 (09-17-22 @ 23:29) (17 - 18)  SpO2: 100% (09-17-22 @ 23:29) (100% - 100%)    GENERAL: NAD, lying in bed comfortably  HEAD:  Atraumatic, Normocephalic  EYES: EOMI, PERRLA, conjunctiva and sclera clear  ENT: Dry mucous membranes  NECK: Supple, No JVD  CHEST/LUNG: Clear to auscultation bilaterally; No rales, rhonchi, wheezing, or rubs. Unlabored respirations  HEART: Regular rate and rhythm; No murmurs, rubs, or gallops  ABDOMEN: BSx4; Soft, nontender, nondistended  EXTREMITIES:  2+ Peripheral Pulses, brisk capillary refill. No clubbing, cyanosis, or edema  NERVOUS SYSTEM:  A&Ox3, no focal deficits   SKIN: No rashes or lesions  Psych: Normal speech, normal behavior, normal affect

## 2022-09-18 NOTE — PATIENT PROFILE ADULT - FUNCTIONAL ASSESSMENT - BASIC MOBILITY SCORE.
Physical Discharge Summary Addendum:  Date: 5/18/2021  Total Number of Visits: 12  Referred by: Pramod Robles MD  Medical Diagnosis (from order):   Diagnosis Information      Diagnosis    724.8 (ICD-9-CM) - M62.830 (ICD-10-CM) - Back muscle spasm                Patient discharged due to not scheduling more appointments.  Status of goals: per status in last daily treatment note     24

## 2022-09-18 NOTE — PROGRESS NOTE ADULT - PROBLEM SELECTOR PLAN 6
- PCI 1 month ago at [facility] - 2 stents placed (NSTEMI)  - C/w asa/statin  - C/w prasugrel 10mg qd - Home: metformin 1000mg BID, jardiance 25mg qd  - elevated urine glucose likely in the setting of Jardiance use  - CC diet  - HbA1C  - ISS

## 2022-09-18 NOTE — H&P ADULT - ASSESSMENT
57F PMH DM, CAD (s/p 2 stents placed at [facility] last month) c/o chills, epigastric discomfort x1 day with associated vomiting (x2 episodes, NBNB) and dysphagia. Admitted for urosepsis, c/b hyponatremia.

## 2022-09-18 NOTE — H&P ADULT - NSHPLABSRESULTS_GEN_ALL_CORE
LABS:                          11.1   14.46 )-----------( 310      ( 17 Sep 2022 20:49 )             34.0         126<L>  |  95<L>  |  20  ----------------------------<  122<H>  3.6   |  15<L>  |  0.70    Ca    8.5      18 Sep 2022 00:10  Phos  1.9       Mg     1.90         TPro  8.4<H>  /  Alb  4.0  /  TBili  0.3  /  DBili  x   /  AST  46<H>  /  ALT  33  /  AlkPhos  146<H>      LIVER FUNCTIONS - ( 17 Sep 2022 20:49 )  Alb: 4.0 g/dL / Pro: 8.4 g/dL / ALK PHOS: 146 U/L / ALT: 33 U/L / AST: 46 U/L / GGT: x                     Urinalysis Basic - ( 17 Sep 2022 21:50 )    Color: Light Yellow / Appearance: Clear / S.016 / pH: x  Gluc: x / Ketone: Moderate  / Bili: Negative / Urobili: <2 mg/dL   Blood: x / Protein: 30 mg/dL / Nitrite: Positive   Leuk Esterase: Small / RBC: 17 /HPF / WBC 23 /HPF   Sq Epi: x / Non Sq Epi: 0 /HPF / Bacteria: Many          Xray Chest 2 Views PA/Lat (22 @ 21:30)    INTERPRETATION:  Clear lungs

## 2022-09-18 NOTE — PROGRESS NOTE ADULT - SUBJECTIVE AND OBJECTIVE BOX
Katrina Owen  PGY-1 Resident Physician   Pager [phone]/ [phone]    Patient is a 57y old  Female who presents with a chief complaint of     SUBJECTIVE / OVERNIGHT EVENTS:  Patient seen and evaluated at bedside.    Denies any fevers, chills, CP, or SOB.    Vital Signs Last 24 Hrs  T(C): 36.6 (18 Sep 2022 04:30), Max: 38.1 (17 Sep 2022 23:29)  T(F): 97.8 (18 Sep 2022 04:30), Max: 100.5 (17 Sep 2022 23:29)  HR: 63 (18 Sep 2022 04:30) (63 - 94)  BP: 117/70 (18 Sep 2022 04:30) (105/60 - 148/71)  BP(mean): --  RR: 17 (18 Sep 2022 04:30) (17 - 18)  SpO2: 100% (18 Sep 2022 04:30) (100% - 100%)    Parameters below as of 18 Sep 2022 04:30  Patient On (Oxygen Delivery Method): room air        PHYSICAL EXAM:  GENERAL: NAD, well-developed  CHEST/LUNG: Clear to auscultation bilaterally; No wheeze  HEART: Regular rate and rhythm; Normal S1 S2, No murmurs, rubs, or gallops  ABDOMEN: Soft, Nontender, Nondistended; Bowel sounds present  EXTREMITIES:  2+ Peripheral Pulses, No clubbing, cyanosis, or edema  PSYCH: AAOx3    LABS:                        11.1   14.46 )-----------( 310      ( 17 Sep 2022 20:49 )             34.0     Hgb Trend: 11.1<--      126<L>  |  95<L>  |  20  ----------------------------<  122<H>  3.6   |  15<L>  |  0.70    Ca    8.5      18 Sep 2022 00:10  Phos  1.9       Mg     1.90         TPro  8.4<H>  /  Alb  4.0  /  TBili  0.3  /  DBili  x   /  AST  46<H>  /  ALT  33  /  AlkPhos  146<H>      Creatinine Trend: 0.70<--, 0.87<--  LIVER FUNCTIONS - ( 17 Sep 2022 20:49 )  Alb: 4.0 g/dL / Pro: 8.4 g/dL / ALK PHOS: 146 U/L / ALT: 33 U/L / AST: 46 U/L / GGT: x                 Urinalysis Basic - ( 17 Sep 2022 21:50 )    Color: Light Yellow / Appearance: Clear / S.016 / pH: x  Gluc: x / Ketone: Moderate  / Bili: Negative / Urobili: <2 mg/dL   Blood: x / Protein: 30 mg/dL / Nitrite: Positive   Leuk Esterase: Small / RBC: 17 /HPF / WBC 23 /HPF   Sq Epi: x / Non Sq Epi: 0 /HPF / Bacteria: Many     Katrina Owen  PGY-1 Resident Physician   Pager [phone]/ [phone]    Patient is a 57y old  Female who presents with a chief complaint of     SUBJECTIVE / OVERNIGHT EVENTS:  Patient seen and evaluated at bedside.  Patient has no concerns this AM. Says she is feeling   Denies any fevers, chills, CP, or SOB.    Vital Signs Last 24 Hrs  T(C): 36.6 (18 Sep 2022 04:30), Max: 38.1 (17 Sep 2022 23:29)  T(F): 97.8 (18 Sep 2022 04:30), Max: 100.5 (17 Sep 2022 23:29)  HR: 63 (18 Sep 2022 04:30) (63 - 94)  BP: 117/70 (18 Sep 2022 04:30) (105/60 - 148/71)  BP(mean): --  RR: 17 (18 Sep 2022 04:30) (17 - 18)  SpO2: 100% (18 Sep 2022 04:30) (100% - 100%)    Parameters below as of 18 Sep 2022 04:30  Patient On (Oxygen Delivery Method): room air        PHYSICAL EXAM:  GENERAL: NAD, well-developed  CHEST/LUNG: Clear to auscultation bilaterally; No wheeze  HEART: Regular rate and rhythm; Normal S1 S2, No murmurs, rubs, or gallops  ABDOMEN: Soft, Nontender, Nondistended; Bowel sounds present  EXTREMITIES:  2+ Peripheral Pulses, No clubbing, cyanosis, or edema  PSYCH: AAOx3    LABS:                        11.1   14.46 )-----------( 310      ( 17 Sep 2022 20:49 )             34.0     Hgb Trend: 11.1<--      126<L>  |  95<L>  |  20  ----------------------------<  122<H>  3.6   |  15<L>  |  0.70    Ca    8.5      18 Sep 2022 00:10  Phos  1.9       Mg     1.90         TPro  8.4<H>  /  Alb  4.0  /  TBili  0.3  /  DBili  x   /  AST  46<H>  /  ALT  33  /  AlkPhos  146<H>      Creatinine Trend: 0.70<--, 0.87<--  LIVER FUNCTIONS - ( 17 Sep 2022 20:49 )  Alb: 4.0 g/dL / Pro: 8.4 g/dL / ALK PHOS: 146 U/L / ALT: 33 U/L / AST: 46 U/L / GGT: x                 Urinalysis Basic - ( 17 Sep 2022 21:50 )    Color: Light Yellow / Appearance: Clear / S.016 / pH: x  Gluc: x / Ketone: Moderate  / Bili: Negative / Urobili: <2 mg/dL   Blood: x / Protein: 30 mg/dL / Nitrite: Positive   Leuk Esterase: Small / RBC: 17 /HPF / WBC 23 /HPF   Sq Epi: x / Non Sq Epi: 0 /HPF / Bacteria: Many     Katrina Owen  PGY-1 Resident Physician   Pager [phone]/ [phone]    Patient is a 57y old  Female who presents with a chief complaint of     SUBJECTIVE / OVERNIGHT EVENTS:  Patient seen and evaluated at bedside.  Patient has no concerns this AM. Says she is feeling better and no longer having any fever or N/V. States her GI symptoms might be "reflux."  Denies any fevers, chills, CP, or SOB.    Vital Signs Last 24 Hrs  T(C): 36.6 (18 Sep 2022 04:30), Max: 38.1 (17 Sep 2022 23:29)  T(F): 97.8 (18 Sep 2022 04:30), Max: 100.5 (17 Sep 2022 23:29)  HR: 63 (18 Sep 2022 04:30) (63 - 94)  BP: 117/70 (18 Sep 2022 04:30) (105/60 - 148/71)  BP(mean): --  RR: 17 (18 Sep 2022 04:30) (17 - 18)  SpO2: 100% (18 Sep 2022 04:30) (100% - 100%)    Parameters below as of 18 Sep 2022 04:30  Patient On (Oxygen Delivery Method): room air        PHYSICAL EXAM:  GENERAL: NAD, well-developed  CHEST/LUNG: Clear to auscultation bilaterally; No wheeze  HEART: Regular rate and rhythm; Normal S1 S2, No murmurs, rubs, or gallops  ABDOMEN: Soft, Nontender, Nondistended; Bowel sounds present  EXTREMITIES:  2+ Peripheral Pulses, No clubbing, cyanosis, or edema  PSYCH: AAOx3    LABS:                        11.1   14.46 )-----------( 310      ( 17 Sep 2022 20:49 )             34.0     Hgb Trend: 11.1<--  09-18    126<L>  |  95<L>  |  20  ----------------------------<  122<H>  3.6   |  15<L>  |  0.70    Ca    8.5      18 Sep 2022 00:10  Phos  1.9     -  Mg     1.90     -    TPro  8.4<H>  /  Alb  4.0  /  TBili  0.3  /  DBili  x   /  AST  46<H>  /  ALT  33  /  AlkPhos  146<H>      Creatinine Trend: 0.70<--, 0.87<--  LIVER FUNCTIONS - ( 17 Sep 2022 20:49 )  Alb: 4.0 g/dL / Pro: 8.4 g/dL / ALK PHOS: 146 U/L / ALT: 33 U/L / AST: 46 U/L / GGT: x                 Urinalysis Basic - ( 17 Sep 2022 21:50 )    Color: Light Yellow / Appearance: Clear / S.016 / pH: x  Gluc: x / Ketone: Moderate  / Bili: Negative / Urobili: <2 mg/dL   Blood: x / Protein: 30 mg/dL / Nitrite: Positive   Leuk Esterase: Small / RBC: 17 /HPF / WBC 23 /HPF   Sq Epi: x / Non Sq Epi: 0 /HPF / Bacteria: Many

## 2022-09-18 NOTE — CONSULT NOTE ADULT - SUBJECTIVE AND OBJECTIVE BOX
Patient is a 57y old  Female who presents with a chief complaint of   HPI:  57F PMH DM, CAD (s/p 2 stents placed at [facility] last month) c/o chills, epigastric discomfort x1 day with associated vomiting (x2 episodes, NBNB) and sensation of liquids/solids being stuck in her stomach (no difficulty swallowing). Pt has no hx of dysphagia but states the last 2 days every time she tried drinking fluids or small amounts of food, she felt as if things were getting stuck in her stomach, causing her to vomit and lose her appetite. Pt denies fever, CP/palpitations, SOB, diarrhea/constipation, dysuria/back pain, changes in BMs, recent travel, new foods, sick contacts, new medications, or hx of UTIs.    ED Course:  - Acetaminophen  - Maalox + Famotidine  - Zofran  - 2L NS + 1L LR  - CTX 1g` (18 Sep 2022 03:06)    Hospital Course:  Repeat labs showing mild HAGMA (AG 17). MICU consulted for concern for euglycemic DKA given on Jardiance at home. AG 17, pH 7.43, BHB 2.8, finger sticks in 100s. Patient reports feeling warm as well as with odynophagia/dysphagia but denies abdominal pain, N/V, CVA tenderness. Reports jardiance started about 3 weeks prior.       prior hospital charts reviewed [ X ]  primary team notes reviewed [ X ]  other consultant notes reviewed [ X ]    PAST MEDICAL & SURGICAL HISTORY:  DM (diabetes mellitus)    CAD (coronary artery disease)    Stented coronary artery    Allergies  penicillin (Rash)    ANTIMICROBIALS (past 90 days)  MEDICATIONS  (STANDING):    cefTRIAXone   IVPB   100 mL/Hr IV Intermittent (22 @ 00:33)        cefTRIAXone   IVPB 1000 every 24 hours    OTHER MEDS: MEDICATIONS  (STANDING):  acetaminophen     Tablet .. 650 every 6 hours PRN  aspirin  chewable 81 daily  atorvastatin 80 at bedtime  dextrose 50% Injectable 25 once  dextrose 50% Injectable 12.5 once  dextrose 50% Injectable 25 once  dextrose Oral Gel 15 once PRN  gabapentin 100 at bedtime  glucagon  Injectable 1 once  influenza   Vaccine 0.5 once  insulin lispro (ADMELOG) corrective regimen sliding scale  three times a day before meals  insulin lispro (ADMELOG) corrective regimen sliding scale  at bedtime  melatonin 3 at bedtime PRN  pantoprazole    Tablet 40 before breakfast  polyethylene glycol 3350 17 daily  prasugrel 10 daily  senna 1 at bedtime    SOCIAL HISTORY:   - No EtOH/smoking/drugs  - ADLs without assistance  - Lives at home with a friend, not working currently (18 Sep 2022 03:06)      FAMILY HISTORY:    REVIEW OF SYSTEMS  [  ] ROS unobtainable because:    [ X ] All other systems negative except as noted below:	    Constitutional:  [X ] fever [ ] chills  [ ] weight loss  [ ] weakness  Skin:  [ ] rash [ ] phlebitis	  Eyes: [ ] icterus [ ] pain  [ ] discharge	  ENMT: [ ] sore throat  [ ] thrush [ ] ulcers [ ] exudates  Respiratory: [ ] dyspnea [ ] hemoptysis [ ] cough [ ] sputum	  Cardiovascular:  [ ] chest pain [ ] palpitations [ ] edema	  Gastrointestinal:  [ ] nausea [ ] vomiting [ ] diarrhea [ ] constipation [ ] pain [X ] odynophagia	  Genitourinary:  [ ] dysuria [ ] frequency [ ] hematuria [ ] discharge [ ] flank pain  [ ] incontinence  Musculoskeletal:  [ ] myalgias [ ] arthralgias [ ] arthritis  [ ] back pain  Neurological:  [ ] headache [ ] seizures  [ ] confusion/altered mental status  Psychiatric:  [ ] anxiety [ ] depression	  Hematology/Lymphatics:  [ ] lymphadenopathy  Endocrine:  [ ] adrenal [ ] thyroid  Allergic/Immunologic:	 [ ] transplant [ ] seasonal    Vital Signs Last 24 Hrs  T(F): 102.5 (22 @ 17:54), Max: 102.5 (22 @ 17:54)  Vital Signs Last 24 Hrs  HR: 77 (22 @ 17:54) (63 - 86)  BP: 145/72 (22 @ 17:54) (105/60 - 148/71)  RR: 18 (22 @ 17:54)  SpO2: 99% (22 @ 17:54) (99% - 100%)  Wt(kg): --    PHYSICAL EXAM:  Constitutional: non-toxic, no distress  HEAD/EYES: anicteric, no conjunctival injection  ENT:  supple, no thrush  Cardiovascular:   normal S1, S2, no murmur, no edema  Respiratory:  clear BS bilaterally, no wheezes, no rales  GI:  soft, non-tender, normal bowel sounds  :  no lema, no CVA tenderness  Musculoskeletal:  no synovitis, normal ROM  Neurologic: awake and alert, normal strength, no focal findings  Skin:  no rash, no erythema, no phlebitis  Heme/Onc: no lymphadenopathy   Psychiatric:  awake, alert, appropriate mood                            10.1   9.50  )-----------( 271      ( 18 Sep 2022 14:20 )             31.1       128<L>  |  94<L>  |  13  ----------------------------<  129<H>  3.9   |  17<L>  |  0.72    Ca    8.9      18 Sep 2022 17:35  Phos  1.5       Mg     2.00         TPro  6.8  /  Alb  3.2<L>  /  TBili  0.2  /  DBili  x   /  AST  37<H>  /  ALT  31  /  AlkPhos  130<H>      Urinalysis Basic - ( 17 Sep 2022 21:50 )    Color: Light Yellow / Appearance: Clear / S.016 / pH: x  Gluc: x / Ketone: Moderate  / Bili: Negative / Urobili: <2 mg/dL   Blood: x / Protein: 30 mg/dL / Nitrite: Positive   Leuk Esterase: Small / RBC: 17 /HPF / WBC 23 /HPF   Sq Epi: x / Non Sq Epi: 0 /HPF / Bacteria: Many    MICROBIOLOGY:    RADIOLOGY:  imaging below personally reviewed and agree with findings

## 2022-09-18 NOTE — CONSULT NOTE ADULT - ASSESSMENT
57F PMH DM, CAD (s/p 2 stents placed at [facility] last month) c/o chills, epigastric discomfort x1 day with associated vomiting (x2 episodes, NBNB) and sensation of liquids/solids being stuck in her stomach found to have urosepsis. MICU consulted for concern for euglycemic DKA.    //HAGMA  //T2DM  //Urosepsis    Suggest:  -Agree with empiric antibiotics  -DC Jardiance  -VBG/BMP/FS q4h  -Agree with IV hydration 57F PMH DM, CAD (s/p 2 stents placed at [place] last month) c/o chills, epigastric discomfort x1 day with associated vomiting (x2 episodes, NBNB) and sensation of liquids/solids being stuck in her stomach found to have urosepsis. MICU consulted for concern for euglycemic DKA.    //HAGMA  //T2DM  //Urosepsis    Suggest:  -BHB elevations possibly euglycemic DKA vs starvation ketosis. pH 7.43 not consistent with acidosis.  -Agree with empiric antibiotics  -DC Jardiance on discharge  -VBG/BMP/FS q4h  -Agree with IV hydration, suggest NS at 75cc/h    Patient is not a MICU candidate at this time. Please callback if questions arise or condition changes.    Seen and d/w MICU attending, Dr. Mae

## 2022-09-18 NOTE — PROGRESS NOTE ADULT - PROBLEM SELECTOR PLAN 8
- Fluids: PO  - Electrolytes: Will replete to maintain K>4, Phos>3, and Mag>2  - Nutrition: clear liquids, AAT  - Activity: OOB as tolerated  - DVT Prophylaxis: prasugrel (home med)  - Stress Ulcer/GI Prophylaxis: pantoprazole  - Disposition: pending medical management

## 2022-09-18 NOTE — H&P ADULT - HISTORY OF PRESENT ILLNESS
57F PMH DM, CAD (s/p 2 stents placed at [place] last month) c/o chills, epigastric discomfort x1 day with associated vomiting (x2 episodes, NBNB) and dysphagia. Pt has no hx of dysphagia but states the last 2 days every time she tried drinking fluids or small amounts of food, she felt as if things were getting stuck in her stomach, causing her to vomit and lose her appetite. Pt denies fever, CP/palpitations, SOB, diarrhea/constipation, dysuria/back pain, changes in BMs, recent travel, new foods, sick contacts, new medications, or hx of UTIs.    ED Course:  - Acetaminophen  - Maalox + Famotidine  - Zofran  - 2L NS + 1L LR  - CTX 1g` 57F PMH DM, CAD (s/p 2 stents placed at [place] last month) c/o chills, epigastric discomfort x1 day with associated vomiting (x2 episodes, NBNB) and sensation of liquids/solids being stuck in her stomach (no difficulty swallowing). Pt has no hx of dysphagia but states the last 2 days every time she tried drinking fluids or small amounts of food, she felt as if things were getting stuck in her stomach, causing her to vomit and lose her appetite. Pt denies fever, CP/palpitations, SOB, diarrhea/constipation, dysuria/back pain, changes in BMs, recent travel, new foods, sick contacts, new medications, or hx of UTIs.    ED Course:  - Acetaminophen  - Maalox + Famotidine  - Zofran  - 2L NS + 1L LR  - CTX 1g`

## 2022-09-18 NOTE — PROGRESS NOTE ADULT - ATTENDING COMMENTS
57F with PMH of DM2, CAD s/p stent x2 (Aug 2022) who presents w/ epigastric pain/fullness w/ nausea/vomiting and fullness. Found to have positive UA along w/ urinary symptoms. Febrile 100.5F and w/ leukocytosis. Labs also notable for hyponatremia, HAGMA likely 2' ketoacidosis, +urine ketones. Admit for sepsis 2' UTI.    Pt seen at bedside. States she has abdominal fullness, can only be comfortable when laying on her side. Labs show stable Na at 125.    -Continue ceftriaxone. F/u cultures.   -On arrival, noted to have AG/ketones. Will check BHB/VBG to ensure acidosis improved. Otherwise, will need to consider euglycemic DKA given pt's Jardiance use.  -Hypophosphatemia - replete as ordered.  -Further eval of abd pain in progress - CT A/P pending.    Plan d/w HS1. 57F with PMH of DM2, CAD s/p stent x2 (Aug 2022) who presents w/ epigastric pain/fullness w/ nausea/vomiting and fullness. Found to have positive UA along w/ urinary symptoms. Febrile 100.5F and w/ leukocytosis. Labs also notable for hyponatremia, HAGMA likely 2' ketoacidosis, +urine ketones. Admit for sepsis 2' UTI.    Pt seen at bedside. States she has abdominal fullness, can only be comfortable when laying on her side. Labs show stable Na at 125.    -Continue ceftriaxone. F/u cultures.   -On arrival, noted to have AG/ketones. Will check BHB/VBG to ensure acidosis improved. Otherwise, will need to consider euglycemic DKA given pt's Jardiance use, though lower suspicion at this time.  -Hypophosphatemia - replete as ordered.  -Further eval of abd pain in progress - CT A/P pending.    Plan d/w HS1.

## 2022-09-18 NOTE — PROGRESS NOTE ADULT - PROBLEM SELECTOR PLAN 3
Hgb 11.1, MCV 76.6.    - Reticulocytes, Iron/TIBC, ferritin >> f/u  - T&S  - Transfuse <7 Na 125 on admission, improved to 126 after 2L fluids. Given hx, suspect hypovolemic hyponatremia.  - Na 125, serum osm 283. Utine Na < 20 and urine osm 416. All suggestive of hypovolemic hyponatremia  - BMP q12h >> goal correction 6-8 mEqs in 24hrs  - s/p 3L fluids given in ED with mild improvement in Na 126  - encourage PO intake  - can give another 1L of fluid if repeat BMP from AM remains low

## 2022-09-18 NOTE — H&P ADULT - NSHPREVIEWOFSYSTEMS_GEN_ALL_CORE
REVIEW OF SYSTEMS:  CONSTITUTIONAL: No weakness, fevers, chills, sick contacts, or unintended weight loss  EYES: No visual changes or vertigo  ENT: No throat pain, rhinorrhea, or hearing loss   NECK: No pain or stiffness  RESPIRATORY: No cough, wheezing, hemoptysis; No shortness of breath  CARDIOVASCULAR: No chest pain or palpitations  GASTROINTESTINAL: No abdominal or epigastric pain. No nausea, vomiting, or hematemesis; No diarrhea or constipation. No melena or hematochezia.  GENITOURINARY: No dysuria, frequency or hematuria  NEUROLOGICAL: No numbness or weakness  SKIN: No itching, rashes, or bruises  Psych: Good mood, no substance use REVIEW OF SYSTEMS:  CONSTITUTIONAL: No weakness, fevers, sick contacts, or unintended weight loss; +chills  EYES: No visual changes or vertigo  ENT: No throat pain, rhinorrhea, or hearing loss  NECK: No pain or stiffness  RESPIRATORY: No cough, wheezing, hemoptysis; No shortness of breath  CARDIOVASCULAR: No chest pain or palpitations  GASTROINTESTINAL: +epigastric pain, n, v; +dysphagia; No diarrhea or constipation. No melena or hematochezia.  GENITOURINARY: No dysuria, frequency or hematuria  NEUROLOGICAL: No numbness or weakness  SKIN: No itching, rashes, or bruises  Psych: Good mood, no substance use

## 2022-09-18 NOTE — PATIENT PROFILE ADULT - FALL HARM RISK - UNIVERSAL INTERVENTIONS
Bed in lowest position, wheels locked, appropriate side rails in place/Call bell, personal items and telephone in reach/Instruct patient to call for assistance before getting out of bed or chair/Non-slip footwear when patient is out of bed/[place] to call system/Physically safe environment - no spills, clutter or unnecessary equipment/Purposeful Proactive Rounding/Room/bathroom lighting operational, light cord in reach

## 2022-09-18 NOTE — PROGRESS NOTE ADULT - ASSESSMENT
57F PMH DM, CAD (s/p 2 stents placed at [place] last month) c/o chills, epigastric discomfort x1 day with associated vomiting (x2 episodes, NBNB) and dysphagia. Admitted for urosepsis, c/b hyponatremia. 57F PMH DM, CAD (s/p 2 stents placed at [place] last month) c/o chills, epigastric discomfort x1 day with associated vomiting (x2 episodes, NBNB) and dysphagia. Admitted for urosepsis, c/b hyponatremia.

## 2022-09-18 NOTE — H&P ADULT - NSHPSOCIALHISTORY_GEN_ALL_CORE
- No EtOH/smoking/drugs  - ADLs without assistance  - Lives at home with a friend, not working currently

## 2022-09-18 NOTE — H&P ADULT - PROBLEM SELECTOR PLAN 6
- DVT PPx: prasugrel 10mg qd  - Diet: CC + TLC    Full Code. - PCI 1 month ago at [place] - 2 stents placed (NSTEMI)  - C/w asa/statin  - C/w prasugrel 10mg qd

## 2022-09-18 NOTE — PROGRESS NOTE ADULT - PROBLEM SELECTOR PLAN 4
- Home: metformin 1000mg BID, jardiance 25mg qd  - CC diet  - HbA1C  - ISS Patient with AG 21 on presentation and pH 7.31 likely in the setting of starvation ketosis 2/2 decreased PO intake.  - urine ketones elevated; no concern for DKA at this time for ketone elevation  - resolving with repeat AG 16 with fluid resuscitation  - encourage PO intake

## 2022-09-18 NOTE — H&P ADULT - PROBLEM SELECTOR PLAN 2
Na 125 on admission, improved to 126 after 2L fluids. Given hx, suspect hypovolemic hyponatremia.    - BMP q12h >> goal correction 6-8 mEqs in 24hrs  - Sosm, Uosm, Asmita >> f/u

## 2022-09-19 DIAGNOSIS — E78.5 HYPERLIPIDEMIA, UNSPECIFIED: ICD-10-CM

## 2022-09-19 DIAGNOSIS — I10 ESSENTIAL (PRIMARY) HYPERTENSION: ICD-10-CM

## 2022-09-19 DIAGNOSIS — E11.9 TYPE 2 DIABETES MELLITUS WITHOUT COMPLICATIONS: ICD-10-CM

## 2022-09-19 LAB
ALBUMIN SERPL ELPH-MCNC: 2.8 G/DL — LOW (ref 3.3–5)
ALP SERPL-CCNC: 114 U/L — SIGNIFICANT CHANGE UP (ref 40–120)
ALT FLD-CCNC: 33 U/L — SIGNIFICANT CHANGE UP (ref 4–33)
ANION GAP SERPL CALC-SCNC: 13 MMOL/L — SIGNIFICANT CHANGE UP (ref 7–14)
ANION GAP SERPL CALC-SCNC: 14 MMOL/L — SIGNIFICANT CHANGE UP (ref 7–14)
ANION GAP SERPL CALC-SCNC: 16 MMOL/L — HIGH (ref 7–14)
AST SERPL-CCNC: 26 U/L — SIGNIFICANT CHANGE UP (ref 4–32)
B-OH-BUTYR SERPL-SCNC: 1.3 MMOL/L — HIGH (ref 0–0.4)
B-OH-BUTYR SERPL-SCNC: 1.6 MMOL/L — HIGH (ref 0–0.4)
B-OH-BUTYR SERPL-SCNC: 1.9 MMOL/L — HIGH (ref 0–0.4)
B-OH-BUTYR SERPL-SCNC: 2 MMOL/L — HIGH (ref 0–0.4)
B-OH-BUTYR SERPL-SCNC: 2.3 MMOL/L — HIGH (ref 0–0.4)
BASE EXCESS BLDV CALC-SCNC: -2.1 MMOL/L — LOW (ref -2–3)
BASE EXCESS BLDV CALC-SCNC: -4.4 MMOL/L — LOW (ref -2–3)
BASE EXCESS BLDV CALC-SCNC: -6.4 MMOL/L — LOW (ref -2–3)
BASOPHILS # BLD AUTO: 0.03 K/UL — SIGNIFICANT CHANGE UP (ref 0–0.2)
BASOPHILS NFR BLD AUTO: 0.3 % — SIGNIFICANT CHANGE UP (ref 0–2)
BILIRUB SERPL-MCNC: <0.2 MG/DL — SIGNIFICANT CHANGE UP (ref 0.2–1.2)
BLOOD GAS VENOUS COMPREHENSIVE RESULT: SIGNIFICANT CHANGE UP
BUN SERPL-MCNC: 10 MG/DL — SIGNIFICANT CHANGE UP (ref 7–23)
BUN SERPL-MCNC: 10 MG/DL — SIGNIFICANT CHANGE UP (ref 7–23)
BUN SERPL-MCNC: 11 MG/DL — SIGNIFICANT CHANGE UP (ref 7–23)
BUN SERPL-MCNC: 12 MG/DL — SIGNIFICANT CHANGE UP (ref 7–23)
BUN SERPL-MCNC: 7 MG/DL — SIGNIFICANT CHANGE UP (ref 7–23)
CA-I SERPL-SCNC: 1.23 MMOL/L — SIGNIFICANT CHANGE UP (ref 1.15–1.33)
CA-I SERPL-SCNC: 1.23 MMOL/L — SIGNIFICANT CHANGE UP (ref 1.15–1.33)
CA-I SERPL-SCNC: 1.24 MMOL/L — SIGNIFICANT CHANGE UP (ref 1.15–1.33)
CALCIUM SERPL-MCNC: 8.8 MG/DL — SIGNIFICANT CHANGE UP (ref 8.4–10.5)
CALCIUM SERPL-MCNC: 8.8 MG/DL — SIGNIFICANT CHANGE UP (ref 8.4–10.5)
CALCIUM SERPL-MCNC: 8.9 MG/DL — SIGNIFICANT CHANGE UP (ref 8.4–10.5)
CALCIUM SERPL-MCNC: 8.9 MG/DL — SIGNIFICANT CHANGE UP (ref 8.4–10.5)
CALCIUM SERPL-MCNC: 9.3 MG/DL — SIGNIFICANT CHANGE UP (ref 8.4–10.5)
CHLORIDE BLDV-SCNC: 100 MMOL/L — SIGNIFICANT CHANGE UP (ref 96–108)
CHLORIDE BLDV-SCNC: 103 MMOL/L — SIGNIFICANT CHANGE UP (ref 96–108)
CHLORIDE BLDV-SCNC: 99 MMOL/L — SIGNIFICANT CHANGE UP (ref 96–108)
CHLORIDE SERPL-SCNC: 101 MMOL/L — SIGNIFICANT CHANGE UP (ref 98–107)
CHLORIDE SERPL-SCNC: 101 MMOL/L — SIGNIFICANT CHANGE UP (ref 98–107)
CHLORIDE SERPL-SCNC: 102 MMOL/L — SIGNIFICANT CHANGE UP (ref 98–107)
CHLORIDE SERPL-SCNC: 97 MMOL/L — LOW (ref 98–107)
CHLORIDE SERPL-SCNC: 98 MMOL/L — SIGNIFICANT CHANGE UP (ref 98–107)
CO2 BLDV-SCNC: 20.2 MMOL/L — LOW (ref 22–26)
CO2 BLDV-SCNC: 20.8 MMOL/L — LOW (ref 22–26)
CO2 BLDV-SCNC: 23.4 MMOL/L — SIGNIFICANT CHANGE UP (ref 22–26)
CO2 SERPL-SCNC: 16 MMOL/L — LOW (ref 22–31)
CO2 SERPL-SCNC: 18 MMOL/L — LOW (ref 22–31)
CO2 SERPL-SCNC: 19 MMOL/L — LOW (ref 22–31)
CO2 SERPL-SCNC: 19 MMOL/L — LOW (ref 22–31)
CO2 SERPL-SCNC: 21 MMOL/L — LOW (ref 22–31)
CREAT SERPL-MCNC: 0.56 MG/DL — SIGNIFICANT CHANGE UP (ref 0.5–1.3)
CREAT SERPL-MCNC: 0.57 MG/DL — SIGNIFICANT CHANGE UP (ref 0.5–1.3)
CREAT SERPL-MCNC: 0.57 MG/DL — SIGNIFICANT CHANGE UP (ref 0.5–1.3)
CREAT SERPL-MCNC: 0.58 MG/DL — SIGNIFICANT CHANGE UP (ref 0.5–1.3)
CREAT SERPL-MCNC: 0.61 MG/DL — SIGNIFICANT CHANGE UP (ref 0.5–1.3)
EGFR: 103 ML/MIN/1.73M2 — SIGNIFICANT CHANGE UP
EGFR: 104 ML/MIN/1.73M2 — SIGNIFICANT CHANGE UP
EGFR: 105 ML/MIN/1.73M2 — SIGNIFICANT CHANGE UP
EOSINOPHIL # BLD AUTO: 0.04 K/UL — SIGNIFICANT CHANGE UP (ref 0–0.5)
EOSINOPHIL NFR BLD AUTO: 0.4 % — SIGNIFICANT CHANGE UP (ref 0–6)
GAS PNL BLDV: 128 MMOL/L — LOW (ref 136–145)
GAS PNL BLDV: 131 MMOL/L — LOW (ref 136–145)
GAS PNL BLDV: 133 MMOL/L — LOW (ref 136–145)
GAS PNL BLDV: SIGNIFICANT CHANGE UP
GLUCOSE BLDC GLUCOMTR-MCNC: 105 MG/DL — HIGH (ref 70–99)
GLUCOSE BLDC GLUCOMTR-MCNC: 157 MG/DL — HIGH (ref 70–99)
GLUCOSE BLDC GLUCOMTR-MCNC: 160 MG/DL — HIGH (ref 70–99)
GLUCOSE BLDC GLUCOMTR-MCNC: 163 MG/DL — HIGH (ref 70–99)
GLUCOSE BLDC GLUCOMTR-MCNC: 170 MG/DL — HIGH (ref 70–99)
GLUCOSE BLDC GLUCOMTR-MCNC: 172 MG/DL — HIGH (ref 70–99)
GLUCOSE BLDV-MCNC: 128 MG/DL — HIGH (ref 70–99)
GLUCOSE BLDV-MCNC: 146 MG/DL — HIGH (ref 70–99)
GLUCOSE BLDV-MCNC: 164 MG/DL — HIGH (ref 70–99)
GLUCOSE SERPL-MCNC: 122 MG/DL — HIGH (ref 70–99)
GLUCOSE SERPL-MCNC: 127 MG/DL — HIGH (ref 70–99)
GLUCOSE SERPL-MCNC: 134 MG/DL — HIGH (ref 70–99)
GLUCOSE SERPL-MCNC: 156 MG/DL — HIGH (ref 70–99)
GLUCOSE SERPL-MCNC: 172 MG/DL — HIGH (ref 70–99)
HCO3 BLDV-SCNC: 19 MMOL/L — LOW (ref 22–29)
HCO3 BLDV-SCNC: 20 MMOL/L — LOW (ref 22–29)
HCO3 BLDV-SCNC: 22 MMOL/L — SIGNIFICANT CHANGE UP (ref 22–29)
HCT VFR BLD CALC: 31.6 % — LOW (ref 34.5–45)
HCT VFR BLDA CALC: 27 % — LOW (ref 34.5–46.5)
HCT VFR BLDA CALC: 30 % — LOW (ref 34.5–46.5)
HCT VFR BLDA CALC: 32 % — LOW (ref 34.5–46.5)
HCV AB S/CO SERPL IA: 0.07 S/CO — SIGNIFICANT CHANGE UP (ref 0–0.99)
HCV AB SERPL-IMP: SIGNIFICANT CHANGE UP
HGB BLD CALC-MCNC: 10 G/DL — LOW (ref 11.5–15.5)
HGB BLD CALC-MCNC: 10.8 G/DL — LOW (ref 11.5–15.5)
HGB BLD CALC-MCNC: 9.1 G/DL — LOW (ref 11.5–15.5)
HGB BLD-MCNC: 10.3 G/DL — LOW (ref 11.5–15.5)
IANC: 7.06 K/UL — SIGNIFICANT CHANGE UP (ref 1.8–7.4)
IMM GRANULOCYTES NFR BLD AUTO: 0.6 % — SIGNIFICANT CHANGE UP (ref 0–0.9)
LACTATE BLDV-MCNC: 1.1 MMOL/L — SIGNIFICANT CHANGE UP (ref 0.5–2)
LACTATE BLDV-MCNC: 1.2 MMOL/L — SIGNIFICANT CHANGE UP (ref 0.5–2)
LACTATE BLDV-MCNC: 2.8 MMOL/L — HIGH (ref 0.5–2)
LYMPHOCYTES # BLD AUTO: 1.12 K/UL — SIGNIFICANT CHANGE UP (ref 1–3.3)
LYMPHOCYTES # BLD AUTO: 11.7 % — LOW (ref 13–44)
MAGNESIUM SERPL-MCNC: 2 MG/DL — SIGNIFICANT CHANGE UP (ref 1.6–2.6)
MAGNESIUM SERPL-MCNC: 2.1 MG/DL — SIGNIFICANT CHANGE UP (ref 1.6–2.6)
MCHC RBC-ENTMCNC: 24.6 PG — LOW (ref 27–34)
MCHC RBC-ENTMCNC: 32.6 GM/DL — SIGNIFICANT CHANGE UP (ref 32–36)
MCV RBC AUTO: 75.4 FL — LOW (ref 80–100)
MONOCYTES # BLD AUTO: 1.27 K/UL — HIGH (ref 0–0.9)
MONOCYTES NFR BLD AUTO: 13.3 % — SIGNIFICANT CHANGE UP (ref 2–14)
NEUTROPHILS # BLD AUTO: 7.06 K/UL — SIGNIFICANT CHANGE UP (ref 1.8–7.4)
NEUTROPHILS NFR BLD AUTO: 73.7 % — SIGNIFICANT CHANGE UP (ref 43–77)
NRBC # BLD: 0 /100 WBCS — SIGNIFICANT CHANGE UP (ref 0–0)
NRBC # FLD: 0 K/UL — SIGNIFICANT CHANGE UP (ref 0–0)
PCO2 BLDV: 32 MMHG — LOW (ref 39–42)
PCO2 BLDV: 36 MMHG — LOW (ref 39–42)
PCO2 BLDV: 37 MMHG — LOW (ref 39–42)
PH BLDV: 7.32 — SIGNIFICANT CHANGE UP (ref 7.32–7.43)
PH BLDV: 7.4 — SIGNIFICANT CHANGE UP (ref 7.32–7.43)
PH BLDV: 7.4 — SIGNIFICANT CHANGE UP (ref 7.32–7.43)
PHOSPHATE SERPL-MCNC: 2.3 MG/DL — LOW (ref 2.5–4.5)
PHOSPHATE SERPL-MCNC: 2.5 MG/DL — SIGNIFICANT CHANGE UP (ref 2.5–4.5)
PHOSPHATE SERPL-MCNC: 2.6 MG/DL — SIGNIFICANT CHANGE UP (ref 2.5–4.5)
PHOSPHATE SERPL-MCNC: 3.6 MG/DL — SIGNIFICANT CHANGE UP (ref 2.5–4.5)
PHOSPHATE SERPL-MCNC: 3.7 MG/DL — SIGNIFICANT CHANGE UP (ref 2.5–4.5)
PLATELET # BLD AUTO: 264 K/UL — SIGNIFICANT CHANGE UP (ref 150–400)
PO2 BLDV: 161 MMHG — SIGNIFICANT CHANGE UP
PO2 BLDV: 39 MMHG — SIGNIFICANT CHANGE UP
PO2 BLDV: 69 MMHG — SIGNIFICANT CHANGE UP
POTASSIUM BLDV-SCNC: 3.2 MMOL/L — LOW (ref 3.5–5.1)
POTASSIUM BLDV-SCNC: 3.4 MMOL/L — LOW (ref 3.5–5.1)
POTASSIUM BLDV-SCNC: 4.4 MMOL/L — SIGNIFICANT CHANGE UP (ref 3.5–5.1)
POTASSIUM SERPL-MCNC: 3.4 MMOL/L — LOW (ref 3.5–5.3)
POTASSIUM SERPL-MCNC: 3.5 MMOL/L — SIGNIFICANT CHANGE UP (ref 3.5–5.3)
POTASSIUM SERPL-MCNC: 3.8 MMOL/L — SIGNIFICANT CHANGE UP (ref 3.5–5.3)
POTASSIUM SERPL-MCNC: 4.3 MMOL/L — SIGNIFICANT CHANGE UP (ref 3.5–5.3)
POTASSIUM SERPL-MCNC: 4.4 MMOL/L — SIGNIFICANT CHANGE UP (ref 3.5–5.3)
POTASSIUM SERPL-SCNC: 3.4 MMOL/L — LOW (ref 3.5–5.3)
POTASSIUM SERPL-SCNC: 3.5 MMOL/L — SIGNIFICANT CHANGE UP (ref 3.5–5.3)
POTASSIUM SERPL-SCNC: 3.8 MMOL/L — SIGNIFICANT CHANGE UP (ref 3.5–5.3)
POTASSIUM SERPL-SCNC: 4.3 MMOL/L — SIGNIFICANT CHANGE UP (ref 3.5–5.3)
POTASSIUM SERPL-SCNC: 4.4 MMOL/L — SIGNIFICANT CHANGE UP (ref 3.5–5.3)
PROT SERPL-MCNC: 7 G/DL — SIGNIFICANT CHANGE UP (ref 6–8.3)
RBC # BLD: 4.19 M/UL — SIGNIFICANT CHANGE UP (ref 3.8–5.2)
RBC # FLD: 14 % — SIGNIFICANT CHANGE UP (ref 10.3–14.5)
SAO2 % BLDV: 65.4 % — SIGNIFICANT CHANGE UP
SAO2 % BLDV: 91.6 % — SIGNIFICANT CHANGE UP
SAO2 % BLDV: 96.8 % — SIGNIFICANT CHANGE UP
SODIUM SERPL-SCNC: 130 MMOL/L — LOW (ref 135–145)
SODIUM SERPL-SCNC: 133 MMOL/L — LOW (ref 135–145)
SODIUM SERPL-SCNC: 134 MMOL/L — LOW (ref 135–145)
WBC # BLD: 9.58 K/UL — SIGNIFICANT CHANGE UP (ref 3.8–10.5)
WBC # FLD AUTO: 9.58 K/UL — SIGNIFICANT CHANGE UP (ref 3.8–10.5)

## 2022-09-19 PROCEDURE — 99223 1ST HOSP IP/OBS HIGH 75: CPT

## 2022-09-19 PROCEDURE — 74176 CT ABD & PELVIS W/O CONTRAST: CPT | Mod: 26

## 2022-09-19 PROCEDURE — 99233 SBSQ HOSP IP/OBS HIGH 50: CPT | Mod: GC

## 2022-09-19 RX ORDER — POTASSIUM CHLORIDE 20 MEQ
40 PACKET (EA) ORAL ONCE
Refills: 0 | Status: COMPLETED | OUTPATIENT
Start: 2022-09-19 | End: 2022-09-19

## 2022-09-19 RX ORDER — INSULIN GLARGINE 100 [IU]/ML
10 INJECTION, SOLUTION SUBCUTANEOUS ONCE
Refills: 0 | Status: COMPLETED | OUTPATIENT
Start: 2022-09-19 | End: 2022-09-19

## 2022-09-19 RX ORDER — SODIUM CHLORIDE 9 MG/ML
1000 INJECTION, SOLUTION INTRAVENOUS
Refills: 0 | Status: DISCONTINUED | OUTPATIENT
Start: 2022-09-19 | End: 2022-09-20

## 2022-09-19 RX ORDER — SIMETHICONE 80 MG/1
80 TABLET, CHEWABLE ORAL DAILY
Refills: 0 | Status: DISCONTINUED | OUTPATIENT
Start: 2022-09-19 | End: 2022-09-21

## 2022-09-19 RX ORDER — SODIUM CHLORIDE 9 MG/ML
1000 INJECTION, SOLUTION INTRAVENOUS
Refills: 0 | Status: DISCONTINUED | OUTPATIENT
Start: 2022-09-19 | End: 2022-09-19

## 2022-09-19 RX ORDER — INSULIN GLARGINE 100 [IU]/ML
12 INJECTION, SOLUTION SUBCUTANEOUS EVERY MORNING
Refills: 0 | Status: DISCONTINUED | OUTPATIENT
Start: 2022-09-20 | End: 2022-09-21

## 2022-09-19 RX ORDER — INSULIN LISPRO 100/ML
VIAL (ML) SUBCUTANEOUS EVERY 6 HOURS
Refills: 0 | Status: DISCONTINUED | OUTPATIENT
Start: 2022-09-19 | End: 2022-09-20

## 2022-09-19 RX ADMIN — SODIUM CHLORIDE 150 MILLILITER(S): 9 INJECTION, SOLUTION INTRAVENOUS at 10:54

## 2022-09-19 RX ADMIN — SODIUM CHLORIDE 125 MILLILITER(S): 9 INJECTION INTRAMUSCULAR; INTRAVENOUS; SUBCUTANEOUS at 04:07

## 2022-09-19 RX ADMIN — Medication 650 MILLIGRAM(S): at 22:47

## 2022-09-19 RX ADMIN — Medication 650 MILLIGRAM(S): at 01:43

## 2022-09-19 RX ADMIN — Medication 81 MILLIGRAM(S): at 11:12

## 2022-09-19 RX ADMIN — Medication 1: at 11:11

## 2022-09-19 RX ADMIN — GABAPENTIN 100 MILLIGRAM(S): 400 CAPSULE ORAL at 21:47

## 2022-09-19 RX ADMIN — CEFTRIAXONE 100 MILLIGRAM(S): 500 INJECTION, POWDER, FOR SOLUTION INTRAMUSCULAR; INTRAVENOUS at 23:17

## 2022-09-19 RX ADMIN — PANTOPRAZOLE SODIUM 40 MILLIGRAM(S): 20 TABLET, DELAYED RELEASE ORAL at 06:55

## 2022-09-19 RX ADMIN — INSULIN GLARGINE 10 UNIT(S): 100 INJECTION, SOLUTION SUBCUTANEOUS at 11:11

## 2022-09-19 RX ADMIN — POLYETHYLENE GLYCOL 3350 17 GRAM(S): 17 POWDER, FOR SOLUTION ORAL at 11:12

## 2022-09-19 RX ADMIN — Medication 650 MILLIGRAM(S): at 00:13

## 2022-09-19 RX ADMIN — Medication 1: at 23:16

## 2022-09-19 RX ADMIN — ATORVASTATIN CALCIUM 80 MILLIGRAM(S): 80 TABLET, FILM COATED ORAL at 21:47

## 2022-09-19 RX ADMIN — Medication 650 MILLIGRAM(S): at 21:47

## 2022-09-19 RX ADMIN — Medication 650 MILLIGRAM(S): at 11:14

## 2022-09-19 RX ADMIN — SODIUM CHLORIDE 150 MILLILITER(S): 9 INJECTION, SOLUTION INTRAVENOUS at 23:53

## 2022-09-19 RX ADMIN — Medication 40 MILLIEQUIVALENT(S): at 18:15

## 2022-09-19 RX ADMIN — SIMETHICONE 80 MILLIGRAM(S): 80 TABLET, CHEWABLE ORAL at 18:15

## 2022-09-19 RX ADMIN — Medication 1: at 17:54

## 2022-09-19 RX ADMIN — Medication 650 MILLIGRAM(S): at 13:44

## 2022-09-19 RX ADMIN — PRASUGREL 10 MILLIGRAM(S): 5 TABLET, FILM COATED ORAL at 11:12

## 2022-09-19 NOTE — PROGRESS NOTE ADULT - PROBLEM SELECTOR PLAN 4
Na 125 on admission, improved to 126 after 2L fluids. Given hx, suspect hypovolemic hyponatremia.  - Na 125, serum osm 283. Utine Na < 20 and urine osm 416. All suggestive of hypovolemic hyponatremia  - BMP q12h >> goal correction 6-8 mEqs in 24hrs  - s/p 3L fluids given in ED with mild improvement in Na 126  - encourage PO intake  - can give another 1L of fluid if repeat BMP from AM remains low Na 125 on admission, improved to 126 after 2L fluids. Given hx, suspect hypovolemic hyponatremia.  - Na 125, serum osm 283. Utine Na < 20 and urine osm 416. All suggestive of hypovolemic hyponatremia  - BMP q12h >> goal correction 6-8 mEqs in 24hrs  - s/p 3L fluids given in ED with mild improvement in Na 126  - currently on D5-LR with improvement in Na  - once diet is resumed, will encourage PO intake

## 2022-09-19 NOTE — PROGRESS NOTE ADULT - ATTENDING COMMENTS
57F with PMH of DM2, CAD s/p stent x2 (Aug 2022) who presents w/ epigastric pain/fullness w/ nausea/vomiting and fullness. Found to have positive UA along w/ urinary symptoms. Febrile 100.5F and w/ leukocytosis. Labs also notable for hyponatremia, HAGMA w/ +urine ketones. Admit for sepsis 2' UTI. Started on abx. HAGMA persisted, follow up labs show elevated BHB. Bicarb remain low. Given hx/clinical picture, pt likely in euglycemic DKA. MICU c/s - not a candidate. Endo on board. Remains hospitalized pending further management.     #euglycemic DKA  -Appreciate endo input. Serial labs, aggressive hydration. Insulin as per recs.  NPO for now.     #Sepsis 2' UTI  -WBC resolved. No new fever. Cont abx. F/u cultures. Narrow abx pending culture data.     #DM2  -Further management as above.  -Review dispo regimen upon DC.     #CAD s/p stents x2  -Stable. Cont     #Dysphagia  -Globus sensation resolved. Tolerating PO up until she was made NPO. Could be 2' GERD.  -CT A/P done - f/u report.   -Cont PPI.   -Further f/u as OP. Patient will need GI follow up for CRC screening and consideration for possible EGD. 57F with PMH of DM2, CAD s/p stent x2 (Aug 2022) who presents w/ epigastric pain/fullness w/ nausea/vomiting and fullness. Found to have positive UA along w/ urinary symptoms. Febrile 100.5F and w/ leukocytosis. Labs also notable for hyponatremia, HAGMA w/ +urine ketones. Admit for sepsis 2' UTI. Started on abx. HAGMA persisted, follow up labs show elevated BHB. Bicarb remain low. Given hx/clinical picture, pt likely in euglycemic DKA. MICU c/s - not a candidate. Endo on board. Remains hospitalized pending further management.     Pt seen and evaluated at bedside this AM. Patient states she feels much better. Abd pain resolved. She tolerating her diet this AM. She had oatmeal. This is a significant improvement from prior. Denies any f/c, NV, SOB. Labs notable for downtrending AG. Na stable. BHB downtrending.     #euglycemic DKA  -Appreciate endo input. Serial labs, aggressive hydration. Insulin as per recs.  NPO for now.   -Once gap closes - re-eval regmien and diet status.     #Sepsis 2' UTI  -WBC resolved. No new fever. Cont abx. F/u cultures. Narrow abx pending culture data.     #DM2  -Further management as above.  -Review dispo regimen upon DC.     #CAD s/p stents x2  -Stable. Cont home meds.    #Dysphagia  -Globus sensation resolved. Tolerating PO up until she was made NPO. Could be 2' GERD.  -CT A/P done - f/u report.   -Cont PPI.   -Further f/u as OP. Patient will need GI follow up for CRC screening and consideration for possible EGD.

## 2022-09-19 NOTE — CONSULT NOTE ADULT - ASSESSMENT
57 yr old F with Type 2 DM uncontrolled A1C 7.7 c/b CAD and neuropathy on jardiance at home here with euglycemic DKA

## 2022-09-19 NOTE — PROGRESS NOTE ADULT - PROBLEM SELECTOR PLAN 2
Patient with AG 21 on presentation and pH 7.31 likely in the setting of starvation ketosis 2/2 decreased PO intake.  - urine ketones elevated on presentation initially thought to be in the setting of starvation ketosis with lower suspicion for euglycemic DKA  - repeat labs with AG 17 and pH 7.43 likely mixed picture of metabolic acidosis and respiratory alkalosis  - BHB elevated 2.8 yesterday raising suspicion for eDKA; now downtrending  - trend BHB and resolution  - started on  cc  - appreciate endo recs Temp 100.5, WBC 14.5 + positive UA. No hx of UTIs previously. No other source suspected.  - c/w ceftriaxone x 3 days (9/18-9/20)  - pending urine culture  - procal elevated suggesting bacterial etiology consistent with UTI  - blood cultures pending  - CXR clear lungs  - repeat fever yesterday 102.5, resolved with tylenol. Cultured already earlier in the day

## 2022-09-19 NOTE — PROGRESS NOTE ADULT - PROBLEM SELECTOR PLAN 6
- Home: metformin 1000mg BID, jardiance 25mg qd  - elevated urine glucose likely in the setting of Jardiance use  - CC diet  - HbA1C  - ISS - Home: metformin 1000mg BID, jardiance 25mg qd  - elevated urine glucose likely in the setting of Jardiance use  - refer to problem 1

## 2022-09-19 NOTE — PROGRESS NOTE ADULT - PROBLEM SELECTOR PLAN 3
Pt describes liquids/solids getting stuck in her stomach, new; denies difficulty initiating swallowing reflex. Also endorsing symptoms of acid reflux. No hx of smoking/EtOH or GERD/h. pylori, significant weight loss. Likely 2/2 GERD vs gastroparesis although less likely with new diabetes dx and HbA1c 7.7.  - pending CT abdomen  - started on pantoprazole 40 QD  - Liquid diet for now >> advance as tolerated  - GI consult Pt describes liquids/solids getting stuck in her stomach, new; denies difficulty initiating swallowing reflex. Also endorsing symptoms of acid reflux. No hx of smoking/EtOH or GERD/h. pylori, significant weight loss. Likely 2/2 GERD vs gastroparesis although less likely with new diabetes dx and HbA1c 7.7.  - CT abdomen with small hiatal hernia which could also contribute to GERD and explain symptoms  - started on pantoprazole 40 QD  - NPO for now for euglycemia DKA treatment but will resume diet once patient is stable from eDKA standpoint

## 2022-09-19 NOTE — CONSULT NOTE ADULT - PROBLEM SELECTOR RECOMMENDATION 9
Continue NPO status and IV fluids (D5/LR)   Patient received Lantus 10 Units this AM  Ensure that Lantus is ordered every morning for this patient   Low admelog correctional scale q 6 hours  Check BMP and BHB q 4 hours  Once AG <12 and HCO3 >18, can start patient on CHO diet  Would then add admelog 3 units before meals plus low correction scale  Recommend nutrition consult  Patient was advised to discontinue jardiance and avoids use of SGLT-2 inhibitors  Discharge regimen TBD: Patient can likely be discharged on metformin  She can f/u with Dr Dixie Velazquez endocrinologist as outpatient

## 2022-09-19 NOTE — PROGRESS NOTE ADULT - SUBJECTIVE AND OBJECTIVE BOX
Katrina Owen  PGY-1 Resident Physician   Pager [phone]/ [phone]    Patient is a 57y old  Female who presents with a chief complaint of     SUBJECTIVE / OVERNIGHT EVENTS:  Patient seen and evaluated at bedside.    Denies any fevers, chills, CP, or SOB.    Vital Signs Last 24 Hrs  T(C): 36.3 (19 Sep 2022 05:25), Max: 39.2 (18 Sep 2022 17:54)  T(F): 97.4 (19 Sep 2022 05:25), Max: 102.5 (18 Sep 2022 17:54)  HR: 68 (19 Sep 2022 05:25) (66 - 77)  BP: 121/70 (19 Sep 2022 05:25) (102/63 - 145/72)  BP(mean): --  RR: 17 (19 Sep 2022 05:25) (17 - 18)  SpO2: 100% (19 Sep 2022 05:25) (99% - 100%)    Parameters below as of 19 Sep 2022 05:25  Patient On (Oxygen Delivery Method): room air        PHYSICAL EXAM:  GENERAL: NAD, well-developed  CHEST/LUNG: Clear to auscultation bilaterally; No wheeze  HEART: Regular rate and rhythm; Normal S1 S2, No murmurs, rubs, or gallops  ABDOMEN: Soft, Nontender, Nondistended; Bowel sounds present  EXTREMITIES:  2+ Peripheral Pulses, No clubbing, cyanosis, or edema  PSYCH: AAOx3    LABS:                        10.3   9.58  )-----------( 264      ( 19 Sep 2022 06:31 )             31.6     Hgb Trend: 10.3<--, 10.1<--, 11.1<--      133<L>  |  101  |  12  ----------------------------<  134<H>  3.8   |  19<L>  |  0.61    Ca    8.8      19 Sep 2022 01:25  Phos  3.6     -  Mg     2.00         TPro  6.8  /  Alb  3.2<L>  /  TBili  0.2  /  DBili  x   /  AST  37<H>  /  ALT  31  /  AlkPhos  130<H>  09-18    Creatinine Trend: 0.61<--, 0.73<--, 0.72<--, 0.66<--, 0.70<--, 0.87<--  LIVER FUNCTIONS - ( 18 Sep 2022 14:20 )  Alb: 3.2 g/dL / Pro: 6.8 g/dL / ALK PHOS: 130 U/L / ALT: 31 U/L / AST: 37 U/L / GGT: x                 Urinalysis Basic - ( 17 Sep 2022 21:50 )    Color: Light Yellow / Appearance: Clear / S.016 / pH: x  Gluc: x / Ketone: Moderate  / Bili: Negative / Urobili: <2 mg/dL   Blood: x / Protein: 30 mg/dL / Nitrite: Positive   Leuk Esterase: Small / RBC: 17 /HPF / WBC 23 /HPF   Sq Epi: x / Non Sq Epi: 0 /HPF / Bacteria: Many     Katrina Owen  PGY-1 Resident Physician   Pager [phone]/ [phone]    Patient is a 57y old  Female who presents with a chief complaint of     SUBJECTIVE / OVERNIGHT EVENTS:  Patient seen and evaluated at bedside.  Patient states she feels well today and has no complaints. Not endorsing any dysuria at this time. States she feels comfortable. States that in terms of diet, she has been trying to drink as much as possible as that does not cause her discomfort however she is not eating solids at this time. States though that she was able to eat Jello without issue.  Upon further questioning, patient states that she does not regularly take her Jardiance. Endorses that she forgets to take it sometimes and will sometimes take it every 2/3 days. Takes her metformin regularly though.  Had fever yesterday which she states was resolved with the Tylenol she was given.     Vital Signs Last 24 Hrs  T(C): 36.3 (19 Sep 2022 05:25), Max: 39.2 (18 Sep 2022 17:54)  T(F): 97.4 (19 Sep 2022 05:25), Max: 102.5 (18 Sep 2022 17:54)  HR: 68 (19 Sep 2022 05:25) (66 - 77)  BP: 121/70 (19 Sep 2022 05:25) (102/63 - 145/72)  BP(mean): --  RR: 17 (19 Sep 2022 05:25) (17 - 18)  SpO2: 100% (19 Sep 2022 05:25) (99% - 100%)    Parameters below as of 19 Sep 2022 05:25  Patient On (Oxygen Delivery Method): room air        PHYSICAL EXAM:  GENERAL: NAD, well-developed  CHEST/LUNG: Clear to auscultation bilaterally; No wheeze  HEART: Regular rate and rhythm; Normal S1 S2, No murmurs, rubs, or gallops  ABDOMEN: Soft, Nontender, Nondistended; Bowel sounds present  EXTREMITIES:  2+ Peripheral Pulses, No clubbing, cyanosis, or edema  PSYCH: AAOx3    LABS:                        10.3   9.58  )-----------( 264      ( 19 Sep 2022 06:31 )             31.6     Hgb Trend: 10.3<--, 10.1<--, 11.1<--  09-19    133<L>  |  101  |  12  ----------------------------<  134<H>  3.8   |  19<L>  |  0.61    Ca    8.8      19 Sep 2022 01:25  Phos  3.6       Mg     2.00         TPro  6.8  /  Alb  3.2<L>  /  TBili  0.2  /  DBili  x   /  AST  37<H>  /  ALT  31  /  AlkPhos  130<H>      Creatinine Trend: 0.61<--, 0.73<--, 0.72<--, 0.66<--, 0.70<--, 0.87<--  LIVER FUNCTIONS - ( 18 Sep 2022 14:20 )  Alb: 3.2 g/dL / Pro: 6.8 g/dL / ALK PHOS: 130 U/L / ALT: 31 U/L / AST: 37 U/L / GGT: x                 Urinalysis Basic - ( 17 Sep 2022 21:50 )    Color: Light Yellow / Appearance: Clear / S.016 / pH: x  Gluc: x / Ketone: Moderate  / Bili: Negative / Urobili: <2 mg/dL   Blood: x / Protein: 30 mg/dL / Nitrite: Positive   Leuk Esterase: Small / RBC: 17 /HPF / WBC 23 /HPF   Sq Epi: x / Non Sq Epi: 0 /HPF / Bacteria: Many

## 2022-09-19 NOTE — PROGRESS NOTE ADULT - PROBLEM SELECTOR PLAN 1
Temp 100.5, WBC 14.5 + positive UA. No hx of UTIs previously. No other source suspected.  - c/w ceftriaxone x 3 days   - pending urine culture  - procal elevated suggesting bacterial etiology consistent with UTI  - blood cultures pending  - CXR clear lungs  - repeat fever yesterday 102.5, blood cultures resent Patient with AG 21 on presentation and pH 7.31 likely in the setting of starvation ketosis 2/2 decreased PO intake vs euglycemic DKA in setting of Jardiance use.  - urine ketones elevated on presentation initially thought to be in the setting of starvation ketosis with lower suspicion for euglycemic DKA  - repeat labs with AG 17 and pH 7.43 likely mixed picture of metabolic acidosis and respiratory alkalosis  - BHB elevated 2.8 yesterday raising suspicion for eDKA  - NPO  - trend BMP (AG, and HCO3), BHB and VBG q4 for resolution  - s/p lantus 10 AM today  - started on D5LR 1500cc; can increase rate if hypoglycemia  - low dose SSI q6   - will continue with lantus 10U AM QD  - once AG 12 and HCO3 <18, can resume diet CC and start on premeal admelog 3U  - c/w SSI  - appreciate endo recs

## 2022-09-19 NOTE — PROGRESS NOTE ADULT - ASSESSMENT
57F PMH DM, CAD (s/p 2 stents placed at [place] last month) c/o chills, epigastric discomfort x1 day with associated vomiting (x2 episodes, NBNB) and dysphagia. Admitted for urosepsis, c/b hyponatremia.

## 2022-09-19 NOTE — CONSULT NOTE ADULT - SUBJECTIVE AND OBJECTIVE BOX
HPI:  57F PMH DM, CAD (s/p 2 stents placed at [place] last month) c/o chills, epigastric discomfort x1 day with associated vomiting (x2 episodes, NBNB) and sensation of liquids/solids being stuck in her stomach (no difficulty swallowing). Pt has no hx of dysphagia but states the last 2 days every time she tried drinking fluids or small amounts of food, she felt as if things were getting stuck in her stomach, causing her to vomit and lose her appetite. Pt denies fever, CP/palpitations, SOB, diarrhea/constipation, dysuria/back pain, changes in BMs, recent travel, new foods, sick contacts, new medications, or hx of UTIs.    ED Course:  - Acetaminophen  - Maalox + Famotidine  - Zofran  - 2L NS + 1L LR  - CTX 1g` (18 Sep 2022 03:06)      PAST MEDICAL & SURGICAL HISTORY:  DM (diabetes mellitus)      CAD (coronary artery disease)      Stented coronary artery          FAMILY HISTORY:      Social History:    Outpatient Medications:    MEDICATIONS  (STANDING):  aspirin  chewable 81 milliGRAM(s) Oral daily  atorvastatin 80 milliGRAM(s) Oral at bedtime  cefTRIAXone   IVPB 1000 milliGRAM(s) IV Intermittent every 24 hours  dextrose 5% + lactated ringers. 1000 milliLiter(s) (150 mL/Hr) IV Continuous <Continuous>  dextrose 5%. 1000 milliLiter(s) (100 mL/Hr) IV Continuous <Continuous>  dextrose 5%. 1000 milliLiter(s) (50 mL/Hr) IV Continuous <Continuous>  dextrose 50% Injectable 25 Gram(s) IV Push once  dextrose 50% Injectable 12.5 Gram(s) IV Push once  dextrose 50% Injectable 25 Gram(s) IV Push once  gabapentin 100 milliGRAM(s) Oral at bedtime  glucagon  Injectable 1 milliGRAM(s) IntraMuscular once  influenza   Vaccine 0.5 milliLiter(s) IntraMuscular once  insulin glargine Injectable (LANTUS) 10 Unit(s) SubCutaneous once  insulin lispro (ADMELOG) corrective regimen sliding scale   SubCutaneous three times a day before meals  insulin lispro (ADMELOG) corrective regimen sliding scale   SubCutaneous at bedtime  pantoprazole    Tablet 40 milliGRAM(s) Oral before breakfast  polyethylene glycol 3350 17 Gram(s) Oral daily  prasugrel 10 milliGRAM(s) Oral daily  senna 1 Tablet(s) Oral at bedtime    MEDICATIONS  (PRN):  acetaminophen     Tablet .. 650 milliGRAM(s) Oral every 6 hours PRN Temp greater or equal to 38C (100.4F), Mild Pain (1 - 3)  dextrose Oral Gel 15 Gram(s) Oral once PRN Blood Glucose LESS THAN 70 milliGRAM(s)/deciliter  melatonin 3 milliGRAM(s) Oral at bedtime PRN Insomnia      Allergies    penicillin (Rash)    Intolerances      Review of Systems:  Constitutional: No fever  Eyes: No blurry vision  Neuro: No tremors  HEENT: No pain  Cardiovascular: No chest pain, palpitations  Respiratory: No SOB, no cough  GI: No nausea, vomiting, abdominal pain  : No dysuria  Skin: no rash  Psych: no depression  Endocrine: no polyuria, polydipsia  Hem/lymph: no swelling  Osteoporosis: no fractures    ALL OTHER SYSTEMS REVIEWED AND NEGATIVE    UNABLE TO OBTAIN    PHYSICAL EXAM:  VITALS: T(C): 36.3 (09-19-22 @ 05:25)  T(F): 97.4 (09-19-22 @ 05:25), Max: 102.5 (09-18-22 @ 17:54)  HR: 68 (09-19-22 @ 05:25) (66 - 77)  BP: 121/70 (09-19-22 @ 05:25) (102/63 - 145/72)  RR:  (17 - 18)  SpO2:  (99% - 100%)  Wt(kg): --  GENERAL: NAD, well-groomed, well-developed  EYES: No proptosis, no lid lag, anicteric  HEENT:  Atraumatic, Normocephalic, moist mucous membranes  THYROID: Normal size, no palpable nodules  RESPIRATORY: Clear to auscultation bilaterally; No rales, rhonchi, wheezing, or rubs  CARDIOVASCULAR: Regular rate and rhythm; No murmurs; no peripheral edema  GI: Soft, nontender, non distended, normal bowel sounds  SKIN: Dry, intact, No rashes or lesions  MUSCULOSKELETAL: Full range of motion, normal strength  NEURO: sensation intact, extraocular movements intact, no tremor, normal reflexes  PSYCH: Alert and oriented x 3, normal affect, normal mood  CUSHING'S SIGNS: no striae    POCT Blood Glucose.: 105 mg/dL (09-19-22 @ 09:02)  POCT Blood Glucose.: 163 mg/dL (09-18-22 @ 22:31)  POCT Blood Glucose.: 130 mg/dL (09-18-22 @ 17:55)  POCT Blood Glucose.: 173 mg/dL (09-18-22 @ 12:23)  POCT Blood Glucose.: 124 mg/dL (09-18-22 @ 08:47)  POCT Blood Glucose.: 136 mg/dL (09-18-22 @ 03:57)  POCT Blood Glucose.: 206 mg/dL (09-17-22 @ 18:55)                            10.3   9.58  )-----------( 264      ( 19 Sep 2022 06:31 )             31.6       09-19    133<L>  |  101  |  11  ----------------------------<  122<H>  4.4   |  16<L>  |  0.57    eGFR: 105    Ca    8.9      09-19  Mg     2.10     09-19  Phos  3.7     09-19    TPro  7.0  /  Alb  2.8<L>  /  TBili  <0.2  /  DBili  x   /  AST  26  /  ALT  33  /  AlkPhos  114  09-19      Thyroid Function Tests:              Radiology:                  HPI:  57F PMH DM, CAD (s/p 2 stents placed at [place] last month) c/o chills, epigastric discomfort x1 day with associated vomiting (x2 episodes, NBNB) and sensation of liquids/solids being stuck in her stomach (no difficulty swallowing). Pt has no hx of dysphagia but states the last 2 days every time she tried drinking fluids or small amounts of food, she felt as if things were getting stuck in her stomach, causing her to vomit and lose her appetite. Pt denies fever, CP/palpitations, SOB, diarrhea/constipation, dysuria/back pain, changes in BMs, recent travel, new foods, sick contacts, new medications, or hx of UTIs.    Endocrine History:    57 yr old F with DM2 uncontrolled A1C 7.7 c/b CAD s/p PCI here with nausea/vomiting found to have urosepsis. Endocrine consulted for euglycemic DKA. Patient noted to have HCO3 16 AG 16 this AM with BHB 2.3. Urine + ketones on arrival. Patient was treated with IV fluids. She was not started on insulin gtt and was given Lantus 10 units this AM. She states she was diagnosed with DM about a month ago when she was hospitalized for CAD. She was discharged on metformin 1g BID and jardiance 25 mg daily. She was taking jardiance every other day. Has cataracts and neuropathy. She was scheduled to f/u with endocrinologist in [place] but could not get a ride. She tries to moderate her intake of carbs. States FS are in the 100s when she checks at home.         PAST MEDICAL & SURGICAL HISTORY:  DM (diabetes mellitus)      CAD (coronary artery disease)      Stented coronary artery          FAMILY HISTORY: noncontributory      Social History: No ETOH abuse, no illicit drug use    Outpatient Medications:   · 	empagliflozin 25 mg oral tablet: Last Dose Taken:  , 1 tab(s) orally once a day (in the morning)  · 	metFORMIN 1000 mg oral tablet: Last Dose Taken:  , 1 tab(s) orally 2 times a day  · 	aspirin 81 mg oral tablet: Last Dose Taken:  , 1 tab(s) orally once a day  · 	rosuvastatin 40 mg oral tablet: 1 tab(s) orally once a day  · 	prasugrel 10 mg oral tablet: Last Dose Taken:  , 1 tab(s) orally once a day  · 	gabapentin 100 mg oral capsule: Last Dose Taken:  , 1 cap(s) orally once a day (at bedtime)    MEDICATIONS  (STANDING):  aspirin  chewable 81 milliGRAM(s) Oral daily  atorvastatin 80 milliGRAM(s) Oral at bedtime  cefTRIAXone   IVPB 1000 milliGRAM(s) IV Intermittent every 24 hours  dextrose 5% + lactated ringers. 1000 milliLiter(s) (150 mL/Hr) IV Continuous <Continuous>  dextrose 5%. 1000 milliLiter(s) (100 mL/Hr) IV Continuous <Continuous>  dextrose 5%. 1000 milliLiter(s) (50 mL/Hr) IV Continuous <Continuous>  dextrose 50% Injectable 25 Gram(s) IV Push once  dextrose 50% Injectable 12.5 Gram(s) IV Push once  dextrose 50% Injectable 25 Gram(s) IV Push once  gabapentin 100 milliGRAM(s) Oral at bedtime  glucagon  Injectable 1 milliGRAM(s) IntraMuscular once  influenza   Vaccine 0.5 milliLiter(s) IntraMuscular once  insulin glargine Injectable (LANTUS) 10 Unit(s) SubCutaneous once  insulin lispro (ADMELOG) corrective regimen sliding scale   SubCutaneous three times a day before meals  insulin lispro (ADMELOG) corrective regimen sliding scale   SubCutaneous at bedtime  pantoprazole    Tablet 40 milliGRAM(s) Oral before breakfast  polyethylene glycol 3350 17 Gram(s) Oral daily  prasugrel 10 milliGRAM(s) Oral daily  senna 1 Tablet(s) Oral at bedtime    MEDICATIONS  (PRN):  acetaminophen     Tablet .. 650 milliGRAM(s) Oral every 6 hours PRN Temp greater or equal to 38C (100.4F), Mild Pain (1 - 3)  dextrose Oral Gel 15 Gram(s) Oral once PRN Blood Glucose LESS THAN 70 milliGRAM(s)/deciliter  melatonin 3 milliGRAM(s) Oral at bedtime PRN Insomnia      Allergies    penicillin (Rash)    Intolerances      Review of Systems:  Constitutional: No fever  Eyes: No blurry vision  Neuro: No tremors  HEENT: No pain  Cardiovascular: No chest pain, palpitations  Respiratory: No SOB, no cough  GI: No nausea, vomiting, abdominal pain  : No dysuria  Skin: no rash  Psych: no depression  Endocrine: no polyuria, polydipsia      ALL OTHER SYSTEMS REVIEWED AND NEGATIVE        PHYSICAL EXAM:  VITALS: T(C): 36.3 (09-19-22 @ 05:25)  T(F): 97.4 (09-19-22 @ 05:25), Max: 102.5 (09-18-22 @ 17:54)  HR: 68 (09-19-22 @ 05:25) (66 - 77)  BP: 121/70 (09-19-22 @ 05:25) (102/63 - 145/72)  RR:  (17 - 18)  SpO2:  (99% - 100%)  Wt(kg): --  GENERAL: NAD, well-groomed, well-developed  EYES: No proptosis, no lid lag, anicteric  HEENT:  Atraumatic, Normocephalic, moist mucous membranes  RESPIRATORY: Clear to auscultation bilaterally  CARDIOVASCULAR: Regular rate and rhythm  GI: Soft, nontender, non distended, normal bowel sounds  SKIN: Dry, intact, No rashes or lesions  MUSCULOSKELETAL: Full range of motion, normal strength  NEURO: sensation intact, extraocular movements intact, no tremor, normal reflexes  PSYCH: Alert and oriented x 3, normal affect, normal mood      POCT Blood Glucose.: 105 mg/dL (09-19-22 @ 09:02)  POCT Blood Glucose.: 163 mg/dL (09-18-22 @ 22:31)  POCT Blood Glucose.: 130 mg/dL (09-18-22 @ 17:55)  POCT Blood Glucose.: 173 mg/dL (09-18-22 @ 12:23)  POCT Blood Glucose.: 124 mg/dL (09-18-22 @ 08:47)  POCT Blood Glucose.: 136 mg/dL (09-18-22 @ 03:57)  POCT Blood Glucose.: 206 mg/dL (09-17-22 @ 18:55)                            10.3   9.58  )-----------( 264      ( 19 Sep 2022 06:31 )             31.6       09-19    133<L>  |  101  |  11  ----------------------------<  122<H>  4.4   |  16<L>  |  0.57    eGFR: 105    Ca    8.9      09-19  Mg     2.10     09-19  Phos  3.7     09-19    TPro  7.0  /  Alb  2.8<L>  /  TBili  <0.2  /  DBili  x   /  AST  26  /  ALT  33  /  AlkPhos  114  09-19

## 2022-09-19 NOTE — PROGRESS NOTE ADULT - PROBLEM SELECTOR PLAN 5
Hgb 11.1, MCV 76.6. Likely a mixed picture on chronic disease and iron deficiency.  - Iron/TIBC low suggesting iron deficiency anemia; can consider iron supplementation OP  - elevated ferritin likely in the setting on chronic illness  - will need to evaluate if patient is up to date on age appropriate cancer screening with colonoscopy and Pap

## 2022-09-19 NOTE — PROGRESS NOTE ADULT - PROBLEM SELECTOR PLAN 8
- Fluids: PO  - Electrolytes: Will replete to maintain K>4, Phos>3, and Mag>2  - Nutrition: clear liquids, AAT  - Activity: OOB as tolerated  - DVT Prophylaxis: prasugrel (home med)  - Stress Ulcer/GI Prophylaxis: pantoprazole  - Disposition: pending medical management - Fluids: PO  - Electrolytes: Will replete to maintain K>4, Phos>3, and Mag>2  - Nutrition: clear liquids, AAT  - Activity: OOB as tolerated  - DVT Prophylaxis: ambulatory  - Stress Ulcer/GI Prophylaxis: pantoprazole  - Disposition: pending medical management

## 2022-09-20 LAB
ANION GAP SERPL CALC-SCNC: 11 MMOL/L — SIGNIFICANT CHANGE UP (ref 7–14)
ANION GAP SERPL CALC-SCNC: 12 MMOL/L — SIGNIFICANT CHANGE UP (ref 7–14)
ANION GAP SERPL CALC-SCNC: 7 MMOL/L — SIGNIFICANT CHANGE UP (ref 7–14)
B-OH-BUTYR SERPL-SCNC: 0.5 MMOL/L — HIGH (ref 0–0.4)
B-OH-BUTYR SERPL-SCNC: 1.1 MMOL/L — HIGH (ref 0–0.4)
B-OH-BUTYR SERPL-SCNC: <0 MMOL/L — SIGNIFICANT CHANGE UP (ref 0–0.4)
BASE EXCESS BLDV CALC-SCNC: -0.7 MMOL/L — SIGNIFICANT CHANGE UP (ref -2–3)
BASE EXCESS BLDV CALC-SCNC: 0.4 MMOL/L — SIGNIFICANT CHANGE UP (ref -2–3)
BASE EXCESS BLDV CALC-SCNC: 1 MMOL/L — SIGNIFICANT CHANGE UP (ref -2–3)
BUN SERPL-MCNC: 5 MG/DL — LOW (ref 7–23)
BUN SERPL-MCNC: 6 MG/DL — LOW (ref 7–23)
BUN SERPL-MCNC: 7 MG/DL — SIGNIFICANT CHANGE UP (ref 7–23)
CA-I SERPL-SCNC: 1.25 MMOL/L — SIGNIFICANT CHANGE UP (ref 1.15–1.33)
CA-I SERPL-SCNC: 1.27 MMOL/L — SIGNIFICANT CHANGE UP (ref 1.15–1.33)
CA-I SERPL-SCNC: 1.3 MMOL/L — SIGNIFICANT CHANGE UP (ref 1.15–1.33)
CALCIUM SERPL-MCNC: 9.1 MG/DL — SIGNIFICANT CHANGE UP (ref 8.4–10.5)
CALCIUM SERPL-MCNC: 9.2 MG/DL — SIGNIFICANT CHANGE UP (ref 8.4–10.5)
CALCIUM SERPL-MCNC: 9.6 MG/DL — SIGNIFICANT CHANGE UP (ref 8.4–10.5)
CHLORIDE BLDV-SCNC: 102 MMOL/L — SIGNIFICANT CHANGE UP (ref 96–108)
CHLORIDE SERPL-SCNC: 101 MMOL/L — SIGNIFICANT CHANGE UP (ref 98–107)
CHLORIDE SERPL-SCNC: 103 MMOL/L — SIGNIFICANT CHANGE UP (ref 98–107)
CHLORIDE SERPL-SCNC: 99 MMOL/L — SIGNIFICANT CHANGE UP (ref 98–107)
CO2 BLDV-SCNC: 23.9 MMOL/L — SIGNIFICANT CHANGE UP (ref 22–26)
CO2 BLDV-SCNC: 25.7 MMOL/L — SIGNIFICANT CHANGE UP (ref 22–26)
CO2 BLDV-SCNC: 27.3 MMOL/L — HIGH (ref 22–26)
CO2 SERPL-SCNC: 21 MMOL/L — LOW (ref 22–31)
CO2 SERPL-SCNC: 23 MMOL/L — SIGNIFICANT CHANGE UP (ref 22–31)
CO2 SERPL-SCNC: 25 MMOL/L — SIGNIFICANT CHANGE UP (ref 22–31)
CREAT SERPL-MCNC: 0.57 MG/DL — SIGNIFICANT CHANGE UP (ref 0.5–1.3)
CREAT SERPL-MCNC: 0.58 MG/DL — SIGNIFICANT CHANGE UP (ref 0.5–1.3)
CREAT SERPL-MCNC: 0.68 MG/DL — SIGNIFICANT CHANGE UP (ref 0.5–1.3)
EGFR: 100 ML/MIN/1.73M2 — SIGNIFICANT CHANGE UP
EGFR: 104 ML/MIN/1.73M2 — SIGNIFICANT CHANGE UP
EGFR: 105 ML/MIN/1.73M2 — SIGNIFICANT CHANGE UP
GAS PNL BLDV: 131 MMOL/L — LOW (ref 136–145)
GAS PNL BLDV: 132 MMOL/L — LOW (ref 136–145)
GAS PNL BLDV: 135 MMOL/L — LOW (ref 136–145)
GAS PNL BLDV: SIGNIFICANT CHANGE UP
GLUCOSE BLDC GLUCOMTR-MCNC: 173 MG/DL — HIGH (ref 70–99)
GLUCOSE BLDC GLUCOMTR-MCNC: 187 MG/DL — HIGH (ref 70–99)
GLUCOSE BLDC GLUCOMTR-MCNC: 194 MG/DL — HIGH (ref 70–99)
GLUCOSE BLDC GLUCOMTR-MCNC: 199 MG/DL — HIGH (ref 70–99)
GLUCOSE BLDC GLUCOMTR-MCNC: 205 MG/DL — HIGH (ref 70–99)
GLUCOSE BLDV-MCNC: 171 MG/DL — HIGH (ref 70–99)
GLUCOSE BLDV-MCNC: 184 MG/DL — HIGH (ref 70–99)
GLUCOSE BLDV-MCNC: 191 MG/DL — HIGH (ref 70–99)
GLUCOSE SERPL-MCNC: 177 MG/DL — HIGH (ref 70–99)
GLUCOSE SERPL-MCNC: 186 MG/DL — HIGH (ref 70–99)
GLUCOSE SERPL-MCNC: 189 MG/DL — HIGH (ref 70–99)
HCO3 BLDV-SCNC: 23 MMOL/L — SIGNIFICANT CHANGE UP (ref 22–29)
HCO3 BLDV-SCNC: 25 MMOL/L — SIGNIFICANT CHANGE UP (ref 22–29)
HCO3 BLDV-SCNC: 26 MMOL/L — SIGNIFICANT CHANGE UP (ref 22–29)
HCT VFR BLD CALC: 31.5 % — LOW (ref 34.5–45)
HCT VFR BLDA CALC: 30 % — LOW (ref 34.5–46.5)
HCT VFR BLDA CALC: 32 % — LOW (ref 34.5–46.5)
HCT VFR BLDA CALC: 34 % — LOW (ref 34.5–46.5)
HGB BLD CALC-MCNC: 10 G/DL — LOW (ref 11.5–15.5)
HGB BLD CALC-MCNC: 10.6 G/DL — LOW (ref 11.5–15.5)
HGB BLD CALC-MCNC: 11.4 G/DL — LOW (ref 11.5–15.5)
HGB BLD-MCNC: 10.2 G/DL — LOW (ref 11.5–15.5)
LACTATE BLDV-MCNC: 0.8 MMOL/L — SIGNIFICANT CHANGE UP (ref 0.5–2)
LACTATE BLDV-MCNC: 1.1 MMOL/L — SIGNIFICANT CHANGE UP (ref 0.5–2)
LACTATE BLDV-MCNC: 1.7 MMOL/L — SIGNIFICANT CHANGE UP (ref 0.5–2)
MAGNESIUM SERPL-MCNC: 1.9 MG/DL — SIGNIFICANT CHANGE UP (ref 1.6–2.6)
MAGNESIUM SERPL-MCNC: 1.9 MG/DL — SIGNIFICANT CHANGE UP (ref 1.6–2.6)
MAGNESIUM SERPL-MCNC: 2 MG/DL — SIGNIFICANT CHANGE UP (ref 1.6–2.6)
MCHC RBC-ENTMCNC: 24.5 PG — LOW (ref 27–34)
MCHC RBC-ENTMCNC: 32.4 GM/DL — SIGNIFICANT CHANGE UP (ref 32–36)
MCV RBC AUTO: 75.7 FL — LOW (ref 80–100)
NRBC # BLD: 0 /100 WBCS — SIGNIFICANT CHANGE UP (ref 0–0)
NRBC # FLD: 0 K/UL — SIGNIFICANT CHANGE UP (ref 0–0)
PCO2 BLDV: 33 MMHG — LOW (ref 39–42)
PCO2 BLDV: 37 MMHG — LOW (ref 39–42)
PCO2 BLDV: 42 MMHG — SIGNIFICANT CHANGE UP (ref 39–42)
PH BLDV: 7.4 — SIGNIFICANT CHANGE UP (ref 7.32–7.43)
PH BLDV: 7.43 — SIGNIFICANT CHANGE UP (ref 7.32–7.43)
PH BLDV: 7.45 — HIGH (ref 7.32–7.43)
PHOSPHATE SERPL-MCNC: 2.2 MG/DL — LOW (ref 2.5–4.5)
PHOSPHATE SERPL-MCNC: 2.3 MG/DL — LOW (ref 2.5–4.5)
PHOSPHATE SERPL-MCNC: 2.7 MG/DL — SIGNIFICANT CHANGE UP (ref 2.5–4.5)
PLATELET # BLD AUTO: 285 K/UL — SIGNIFICANT CHANGE UP (ref 150–400)
PO2 BLDV: 36 MMHG — SIGNIFICANT CHANGE UP
PO2 BLDV: 56 MMHG — SIGNIFICANT CHANGE UP
PO2 BLDV: 67 MMHG — SIGNIFICANT CHANGE UP
POTASSIUM BLDV-SCNC: 3.5 MMOL/L — SIGNIFICANT CHANGE UP (ref 3.5–5.1)
POTASSIUM BLDV-SCNC: 3.6 MMOL/L — SIGNIFICANT CHANGE UP (ref 3.5–5.1)
POTASSIUM BLDV-SCNC: 3.6 MMOL/L — SIGNIFICANT CHANGE UP (ref 3.5–5.1)
POTASSIUM SERPL-MCNC: 3.6 MMOL/L — SIGNIFICANT CHANGE UP (ref 3.5–5.3)
POTASSIUM SERPL-MCNC: 3.6 MMOL/L — SIGNIFICANT CHANGE UP (ref 3.5–5.3)
POTASSIUM SERPL-MCNC: 3.8 MMOL/L — SIGNIFICANT CHANGE UP (ref 3.5–5.3)
POTASSIUM SERPL-SCNC: 3.6 MMOL/L — SIGNIFICANT CHANGE UP (ref 3.5–5.3)
POTASSIUM SERPL-SCNC: 3.6 MMOL/L — SIGNIFICANT CHANGE UP (ref 3.5–5.3)
POTASSIUM SERPL-SCNC: 3.8 MMOL/L — SIGNIFICANT CHANGE UP (ref 3.5–5.3)
RBC # BLD: 4.16 M/UL — SIGNIFICANT CHANGE UP (ref 3.8–5.2)
RBC # FLD: 14 % — SIGNIFICANT CHANGE UP (ref 10.3–14.5)
SAO2 % BLDV: 57.4 % — SIGNIFICANT CHANGE UP
SAO2 % BLDV: 87.1 % — SIGNIFICANT CHANGE UP
SAO2 % BLDV: 94.1 % — SIGNIFICANT CHANGE UP
SODIUM SERPL-SCNC: 131 MMOL/L — LOW (ref 135–145)
SODIUM SERPL-SCNC: 133 MMOL/L — LOW (ref 135–145)
SODIUM SERPL-SCNC: 138 MMOL/L — SIGNIFICANT CHANGE UP (ref 135–145)
WBC # BLD: 9.23 K/UL — SIGNIFICANT CHANGE UP (ref 3.8–10.5)
WBC # FLD AUTO: 9.23 K/UL — SIGNIFICANT CHANGE UP (ref 3.8–10.5)

## 2022-09-20 PROCEDURE — 99232 SBSQ HOSP IP/OBS MODERATE 35: CPT | Mod: GC

## 2022-09-20 RX ORDER — EMPAGLIFLOZIN 10 MG/1
1 TABLET, FILM COATED ORAL
Qty: 0 | Refills: 0 | DISCHARGE

## 2022-09-20 RX ORDER — SENNA PLUS 8.6 MG/1
1 TABLET ORAL
Qty: 14 | Refills: 0
Start: 2022-09-20 | End: 2022-10-03

## 2022-09-20 RX ORDER — SIMETHICONE 80 MG/1
1 TABLET, CHEWABLE ORAL
Qty: 30 | Refills: 0
Start: 2022-09-20 | End: 2022-10-19

## 2022-09-20 RX ORDER — INSULIN LISPRO 100/ML
3 VIAL (ML) SUBCUTANEOUS
Refills: 0 | Status: DISCONTINUED | OUTPATIENT
Start: 2022-09-20 | End: 2022-09-21

## 2022-09-20 RX ORDER — POLYETHYLENE GLYCOL 3350 17 G/17G
17 POWDER, FOR SOLUTION ORAL
Qty: 238 | Refills: 0
Start: 2022-09-20 | End: 2022-10-03

## 2022-09-20 RX ORDER — PANTOPRAZOLE SODIUM 20 MG/1
1 TABLET, DELAYED RELEASE ORAL
Qty: 30 | Refills: 0
Start: 2022-09-20 | End: 2022-10-19

## 2022-09-20 RX ORDER — INSULIN LISPRO 100/ML
VIAL (ML) SUBCUTANEOUS
Refills: 0 | Status: DISCONTINUED | OUTPATIENT
Start: 2022-09-20 | End: 2022-09-21

## 2022-09-20 RX ADMIN — SIMETHICONE 80 MILLIGRAM(S): 80 TABLET, CHEWABLE ORAL at 12:37

## 2022-09-20 RX ADMIN — SODIUM CHLORIDE 150 MILLILITER(S): 9 INJECTION, SOLUTION INTRAVENOUS at 09:35

## 2022-09-20 RX ADMIN — Medication 3 UNIT(S): at 17:45

## 2022-09-20 RX ADMIN — ATORVASTATIN CALCIUM 80 MILLIGRAM(S): 80 TABLET, FILM COATED ORAL at 23:05

## 2022-09-20 RX ADMIN — PRASUGREL 10 MILLIGRAM(S): 5 TABLET, FILM COATED ORAL at 12:36

## 2022-09-20 RX ADMIN — Medication 2: at 22:34

## 2022-09-20 RX ADMIN — Medication 3 UNIT(S): at 12:36

## 2022-09-20 RX ADMIN — PANTOPRAZOLE SODIUM 40 MILLIGRAM(S): 20 TABLET, DELAYED RELEASE ORAL at 05:46

## 2022-09-20 RX ADMIN — INSULIN GLARGINE 12 UNIT(S): 100 INJECTION, SOLUTION SUBCUTANEOUS at 09:35

## 2022-09-20 RX ADMIN — GABAPENTIN 100 MILLIGRAM(S): 400 CAPSULE ORAL at 23:05

## 2022-09-20 RX ADMIN — Medication 81 MILLIGRAM(S): at 12:37

## 2022-09-20 RX ADMIN — Medication 1: at 05:45

## 2022-09-20 RX ADMIN — Medication 1: at 12:36

## 2022-09-20 RX ADMIN — Medication 1: at 17:45

## 2022-09-20 RX ADMIN — Medication 3 UNIT(S): at 09:34

## 2022-09-20 NOTE — DIETITIAN INITIAL EVALUATION ADULT - ADD RECOMMEND
1) Recommend continue current diet, which remains appropriate at this time.   2) Encouraged PO intake as tolerated, with inclusion of regular meal pattern.

## 2022-09-20 NOTE — PROGRESS NOTE ADULT - PROBLEM SELECTOR PLAN 1
Patient with AG 21 on presentation and pH 7.31 likely in the setting of starvation ketosis 2/2 decreased PO intake vs euglycemic DKA in setting of Jardiance use.  - urine ketones elevated on presentation initially thought to be in the setting of starvation ketosis with lower suspicion for euglycemic DKA  - repeat labs with AG 17 and pH 7.43 likely mixed picture of metabolic acidosis and respiratory alkalosis  - BHB elevated 2.8 yesterday raising suspicion for eDKA  - NPO  - trend BMP (AG, and HCO3), BHB and VBG q4 for resolution  - s/p lantus 10 AM today  - started on D5LR 1500cc; can increase rate if hypoglycemia  - low dose SSI q6   - will continue with lantus 10U AM QD  - once AG 12 and HCO3 <18, can resume diet CC and start on premeal admelog 3U  - c/w SSI  - appreciate endo recs Patient with AG 21 on presentation and pH 7.31 likely in the setting of starvation ketosis 2/2 decreased PO intake vs euglycemic DKA in setting of Jardiance use.  - urine ketones elevated on presentation initially thought to be in the setting of starvation ketosis with lower suspicion for euglycemic DKA. BHB elevated 2.8 raising suspicion for eDKA.  - overnight, AG closed and bicarb > 18 and patient was started on a CC diet with 3u admelog premeal  - lantus increased from 10U to 12U per endo recs  - c/w SSI premeal   - will discharge on metformin and discontinue jardiance  - appreciate endo recs

## 2022-09-20 NOTE — PROGRESS NOTE ADULT - SUBJECTIVE AND OBJECTIVE BOX
Katrina Owen  PGY-1 Resident Physician   Pager [phone]/ [phone]    Patient is a 57y old  Female who presents with a chief complaint of     SUBJECTIVE / OVERNIGHT EVENTS:  Patient seen and evaluated at bedside.    Denies any fevers, chills, CP, or SOB.  Overnight, AG closed and bicarb > 18 and patient was started on a CC diet with 3u admelog premeal. Lantus increased from 10U to 12U per endo recs.    Vital Signs Last 24 Hrs  T(C): 37.2 (20 Sep 2022 05:55), Max: 37.2 (20 Sep 2022 05:55)  T(F): 98.9 (20 Sep 2022 05:55), Max: 98.9 (20 Sep 2022 05:55)  HR: 79 (20 Sep 2022 05:55) (77 - 83)  BP: 127/65 (20 Sep 2022 05:55) (127/65 - 145/78)  BP(mean): --  RR: 18 (20 Sep 2022 05:55) (17 - 18)  SpO2: 100% (20 Sep 2022 05:55) (99% - 100%)    Parameters below as of 20 Sep 2022 05:55  Patient On (Oxygen Delivery Method): room air        PHYSICAL EXAM:  GENERAL: NAD, well-developed  CHEST/LUNG: Clear to auscultation bilaterally; No wheeze  HEART: Regular rate and rhythm; Normal S1 S2, No murmurs, rubs, or gallops  ABDOMEN: Soft, Nontender, Nondistended; Bowel sounds present  EXTREMITIES:  2+ Peripheral Pulses, No clubbing, cyanosis, or edema  PSYCH: AAOx3    LABS:                        10.2   9.23  )-----------( 285      ( 20 Sep 2022 04:18 )             31.5     Hgb Trend: 10.2<--, 10.3<--, 10.1<--, 11.1<--  09-20    133<L>  |  101  |  6<L>  ----------------------------<  189<H>  3.6   |  25  |  0.58    Ca    9.2      20 Sep 2022 04:18  Phos  2.7     09-20  Mg     2.00     09-20    TPro  7.0  /  Alb  2.8<L>  /  TBili  <0.2  /  DBili  x   /  AST  26  /  ALT  33  /  AlkPhos  114  09-19    Creatinine Trend: 0.58<--, 0.57<--, 0.57<--, 0.56<--, 0.58<--, 0.57<--  LIVER FUNCTIONS - ( 19 Sep 2022 06:31 )  Alb: 2.8 g/dL / Pro: 7.0 g/dL / ALK PHOS: 114 U/L / ALT: 33 U/L / AST: 26 U/L / GGT: x                  Katrina Owen  PGY-1 Resident Physician   Pager [phone]/ [phone]    Patient is a 57y old  Female who presents with a chief complaint of     SUBJECTIVE / OVERNIGHT EVENTS:  Patient seen and evaluated at bedside.  Patient states she is feeling well today. Feels much better and is walking around comfortably. Has no concerns. Ready to go home.  Denies any fevers, chills, CP, or SOB.  Overnight, AG closed and bicarb > 18 and patient was started on a CC diet with 3u admelog premeal. Lantus increased from 10U to 12U per endo recs.    Vital Signs Last 24 Hrs  T(C): 37.2 (20 Sep 2022 05:55), Max: 37.2 (20 Sep 2022 05:55)  T(F): 98.9 (20 Sep 2022 05:55), Max: 98.9 (20 Sep 2022 05:55)  HR: 79 (20 Sep 2022 05:55) (77 - 83)  BP: 127/65 (20 Sep 2022 05:55) (127/65 - 145/78)  BP(mean): --  RR: 18 (20 Sep 2022 05:55) (17 - 18)  SpO2: 100% (20 Sep 2022 05:55) (99% - 100%)    Parameters below as of 20 Sep 2022 05:55  Patient On (Oxygen Delivery Method): room air        PHYSICAL EXAM:  GENERAL: NAD, well-developed  CHEST/LUNG: Clear to auscultation bilaterally; No wheeze  HEART: Regular rate and rhythm; Normal S1 S2, No murmurs, rubs, or gallops  ABDOMEN: Soft, Nontender, Nondistended; Bowel sounds present  EXTREMITIES:  2+ Peripheral Pulses, No clubbing, cyanosis, or edema  PSYCH: AAOx3    LABS:                        10.2   9.23  )-----------( 285      ( 20 Sep 2022 04:18 )             31.5     Hgb Trend: 10.2<--, 10.3<--, 10.1<--, 11.1<--  09-20    133<L>  |  101  |  6<L>  ----------------------------<  189<H>  3.6   |  25  |  0.58    Ca    9.2      20 Sep 2022 04:18  Phos  2.7     09-20  Mg     2.00     09-20    TPro  7.0  /  Alb  2.8<L>  /  TBili  <0.2  /  DBili  x   /  AST  26  /  ALT  33  /  AlkPhos  114  09-19    Creatinine Trend: 0.58<--, 0.57<--, 0.57<--, 0.56<--, 0.58<--, 0.57<--  LIVER FUNCTIONS - ( 19 Sep 2022 06:31 )  Alb: 2.8 g/dL / Pro: 7.0 g/dL / ALK PHOS: 114 U/L / ALT: 33 U/L / AST: 26 U/L / GGT: x

## 2022-09-20 NOTE — PROGRESS NOTE ADULT - PROBLEM SELECTOR PLAN 2
Temp 100.5, WBC 14.5 + positive UA. No hx of UTIs previously. No other source suspected.  - c/w ceftriaxone x 3 days (9/18-9/20)  - pending urine culture  - procal elevated suggesting bacterial etiology consistent with UTI  - blood cultures pending  - CXR clear lungs  - repeat fever yesterday 102.5, resolved with tylenol. Cultured already earlier in the day Temp 100.5, WBC 14.5 + positive UA. No hx of UTIs previously. No other source suspected.  - last day of ceftriaxone x 3 days (9/18-9/20)  - urine culture positive for E coli  - procal elevated suggesting bacterial etiology consistent with UTI  - blood cultures NGTD  - CXR clear lungs

## 2022-09-20 NOTE — DIETITIAN INITIAL EVALUATION ADULT - OTHER CALCULATIONS
Ideal Body Weight: 110 lbs / 50 kg +/-10%   Dosing weight (9/18) 130.2 lbs. No weight history available via chart.

## 2022-09-20 NOTE — DISCHARGE NOTE PROVIDER - HOSPITAL COURSE
57F PMH DM, CAD (s/p 2 stents placed at [place] last month) c/o chills, epigastric discomfort x1 day with associated vomiting (x2 episodes, NBNB) and sensation of liquids/solids being stuck in her stomach (no difficulty swallowing). Pt has no hx of dysphagia but states the last 2 days every time she tried drinking fluids or small amounts of food, she felt as if things were getting stuck in her stomach, causing her to vomit and lose her appetite. Pt denies fever, CP/palpitations, SOB, diarrhea/constipation, dysuria/back pain, changes in BMs, recent travel, new foods, sick contacts, new medications, or hx of UTIs.    Hospital Course:   In the ED, patient met sepsis criteria with elevated WBC and fever. UA notable for bacteria and nitrite. Patient received 3L fluids and ceftriaxone in the ED. Patient also with hyponatremia on admission, thought to be secondary hypovolemia. Hyponatremia improved with fluids in the ED. During admission, patient also found to have acidosis with elevated BHB thought to be secondary to euglycemic DKA i/s/o Jardiance use. Endo was consulted and patient underwent eugylcemic DKA protocol on the floor with lantus and q6 ISS. Pt also started on standing fluids with dextrose. Patient lab work improved and her DKA resolved. She was transitioned to a basal bolus regiment. During this time, she received 3 day course of abx for the UTI. Patient is currently feeling stable and is ready for discharge. She will follow-up with outpatient endocrinologist. She will be discharged on metformin 1000 BID    On admission, patient also with feeling of discomfort in epigastric region after eating; pt started on PPI and sx improved. Patient is currently feeling well. Will refer her to GI for further follow-up and screening

## 2022-09-20 NOTE — DIETITIAN INITIAL EVALUATION ADULT - OTHER INFO
Per chart, pt is 57 year old female PMH type 2 DM, CAD (s/p 2 stents last month) presenting with chills, GI distress, dysphagia admitted for urosepsis complicated by hyponatremia.    Pt confirms NKFA, denies difficulties chewing/swallowing. Pt does not consume beef or pork and avoids citrus/acid foods. Pt endorses decreased appetite/PO intake x2 days PTA secondary to sensation of acid reflux with postprandial vomiting. History of type 2 DM, pt made aware of current HbA1c (9/18) 7.7%. Medications PTA inclusive of Metformin 1000 mg BID and Jardiance 25 mg qD; pt was taking Jardiance every 2/3 days. Fingersticks typically ~100s. Pt reports adhering to therapeutic diet PTA; monitors carbohydrate intake, avoids desserts/sugar sweetened beverages and does not add salt when cooking. Pt lives at home with a friend (who also has diabetes), denies difficulties with preparing food and has recently applied for food stamps as she is not working.    Pt reports improvement in symptoms since admission, tolerating diet and denies GI distress. Endocrinology was consulted as pt with [facility]. Plan for discharge on Metformin with d/c of Jardiance. Labs notable for hyponatremia. Pt continues on IVF with D5.

## 2022-09-20 NOTE — PROGRESS NOTE ADULT - PROBLEM SELECTOR PLAN 7
- PCI 1 month ago at [place] - 2 stents placed (NSTEMI)  - C/w asa/statin  - C/w prasugrel 10mg qd

## 2022-09-20 NOTE — DIETITIAN INITIAL EVALUATION ADULT - PERTINENT LABORATORY DATA
(9/20) Na 138, BUN 5<L>, Cr 0.68, <H>, K+ 3.8, Phos 2.2<L>, Mg 1.90  (9/18) HbA1c 7.7%<H>  POCT: (9/20) 173-199, (9/19) 105-172

## 2022-09-20 NOTE — DISCHARGE NOTE PROVIDER - NSDCCPCAREPLAN_GEN_ALL_CORE_FT
PRINCIPAL DISCHARGE DIAGNOSIS  Diagnosis: Acute UTI  Assessment and Plan of Treatment: You came to the hospital with fevers, chills, and burning with urination. You were found to have a urine infection on admission. You were treated with 3 days of IV antibiotics. Currently, you are feeling well and stable for discharge.  Please follow-up with your doctor in 1-2 weeks. If your symptoms worsen or return, please contact your doctor immediately. If you are not able to reach your doctor, please go to the nearest emergency department.        SECONDARY DISCHARGE DIAGNOSES  Diagnosis: Type 2 diabetes mellitus  Assessment and Plan of Treatment: You have history of diabetes. At home, you are on jardiance and metformin. During admission, we found that you have acidosis in your blood and it was concerning for euglycemic DKA, which is a rare side effect of jardiance. We stopped your medications and started you on insulin to manage our acidosis and sugars. Your blood work improved and you continued to be stable. Currently, you are stable for discharge. Please follow-up with Dr. Dixie Velazquez for further management of your diabetes    Diagnosis: Acute UTI  Assessment and Plan of Treatment:     Diagnosis: Abdominal discomfort  Assessment and Plan of Treatment: On your presentation, you were complaining of abdominal discomfort with eating. We think this is because of heart burn. You were started on pantoprazole and your symptoms improved. Currently, you are stable to be discharged. Please continue to take prescribed medication. We are also referring you to GI for further evaluation and management     PRINCIPAL DISCHARGE DIAGNOSIS  Diagnosis: Acute UTI  Assessment and Plan of Treatment: You came to the hospital with fevers, chills, and burning with urination. You were found to have a urine infection on admission. You were treated with 3 days of IV antibiotics. Currently, you are feeling well and stable for discharge.  Please follow-up with your doctor in 1-2 weeks. If your symptoms worsen or return, please contact your doctor immediately. If you are not able to reach your doctor, please go to the nearest emergency department.        SECONDARY DISCHARGE DIAGNOSES  Diagnosis: Type 2 diabetes mellitus  Assessment and Plan of Treatment: You have history of diabetes. At home, you are on jardiance and metformin. During admission, we found that you have acidosis in your blood and it was concerning for euglycemic DKA, which is a rare side effect of jardiance. We stopped your medications and started you on insulin to manage our acidosis and sugars. Your blood work improved and you continued to be stable. Currently, you are stable for discharge. Please follow-up with Dr. Dixie Velazquez for further management of your diabetes. At this time, PLEASE HOLD JARDIANCE AND DO NOT TAKE THIS MEDICATION    Diagnosis: Acute UTI  Assessment and Plan of Treatment:     Diagnosis: Abdominal discomfort  Assessment and Plan of Treatment: On your presentation, you were complaining of abdominal discomfort with eating. We think this is because of heart burn. You were started on pantoprazole and your symptoms improved. Currently, you are stable to be discharged. Please continue to take prescribed medication. We are also referring you to GI for further evaluation and management     PRINCIPAL DISCHARGE DIAGNOSIS  Diagnosis: Acute UTI  Assessment and Plan of Treatment: You came to the hospital with fevers, chills, and burning with urination. You were found to have a urine infection on admission. You were treated with 3 days of IV antibiotics. Currently, you are feeling well and stable for discharge.  Please follow-up with your doctor in 1-2 weeks. If your symptoms worsen or return, please contact your doctor immediately. If you are not able to reach your doctor, please go to the nearest emergency department.        SECONDARY DISCHARGE DIAGNOSES  Diagnosis: Type 2 diabetes mellitus  Assessment and Plan of Treatment: You have history of diabetes. At home, you are on jardiance and metformin. During admission, we found that you have acidosis in your blood and it was concerning for euglycemic DKA, which is a rare side effect of jardiance. We stopped your medications and started you on insulin to manage our acidosis and sugars. Your blood work improved and you continued to be stable. Currently, you are stable for discharge. Please follow-up with Dr. Dixie Velazquez for further management of your diabetes. At this time, PLEASE HOLD JARDIANCE AND DO NOT TAKE THIS MEDICATION    Diagnosis: Abdominal discomfort  Assessment and Plan of Treatment: On your presentation, you were complaining of abdominal discomfort with eating. We think this is because of heart burn. You were started on pantoprazole and your symptoms improved. Currently, you are stable to be discharged. Please continue to take prescribed medication. We are also referring you to GI for further evaluation and management

## 2022-09-20 NOTE — PROGRESS NOTE ADULT - PROBLEM SELECTOR PLAN 8
- Fluids: PO  - Electrolytes: Will replete to maintain K>4, Phos>3, and Mag>2  - Nutrition: clear liquids, AAT  - Activity: OOB as tolerated  - DVT Prophylaxis: ambulatory  - Stress Ulcer/GI Prophylaxis: pantoprazole  - Disposition: pending medical management

## 2022-09-20 NOTE — PROGRESS NOTE ADULT - ATTENDING COMMENTS
57F with PMH of DM2, CAD s/p stent x2 (Aug 2022) who presents w/ epigastric pain/fullness w/ nausea/vomiting and fullness. Found to have positive UA along w/ urinary symptoms. Febrile 100.5F and w/ leukocytosis. Labs also notable for hyponatremia, HAGMA w/ +urine ketones. Admit for sepsis 2' UTI. Started on abx. HAGMA persisted, follow up labs show elevated BHB. Bicarb remain low. Given hx/clinical picture, pt likely in euglycemic DKA. MICU c/s - not a candidate. Endo on board. Clinically improved w/ intervention. Gap closed. Glycemic control remains optimal. Dispo planning.    Pt seen and evaluated at bedside this AM. No o/n events. Called by RN due to watery BMx3 today. Pt re-eval at bedside. States stool loose. Exam improved. NO abd pain. tolerating PO. No dysphagia. Labs optimal.    #Diarrhea  -Likeyl 2' laxative use.  -DC all laxatives.   -Monitor or resolution.   -Hold off DC today given recent DKA - would want to ensure diarrhea resolves given high risk for volume depletion and concerns for going back into DKA.     #euglycemic DKA  -Gap closed. Tolerating PO. Did well w/ SQ insulin.  -Dispo recommendations from endo appreciated.     #Sepsis 2' E Coli UTI  -WBC resolved. No new fever. Completed CTX x3.     #DM2  -CHO diet.  -Metformin on DC. Appreciate endo recs.  -DC jardiance.    #CAD s/p stents x2  -Stable. Cont home meds.    #Dysphagia  -Resolved. CT A/P reviewed - only notable for bladder wall thickening. Rest of read per report.   -Cont PPI.   -Further f/u as OP. Patient will need GI follow up for CRC screening    Rest of plan as above. Anticipate DC in 24h.

## 2022-09-20 NOTE — DISCHARGE NOTE PROVIDER - CARE PROVIDER_API CALL
Dixie Velazquez)  EndocrinologyMetabDiabetes; Internal Medicine  [address] [place] [address]  Phone: [phone]  Fax: [phone]  Follow Up Time:

## 2022-09-20 NOTE — PROGRESS NOTE ADULT - PROBLEM SELECTOR PLAN 5
Hgb 11.1, MCV 76.6. Likely a mixed picture on chronic disease and iron deficiency.  - Iron/TIBC low suggesting iron deficiency anemia; can consider iron supplementation OP  - elevated ferritin likely in the setting on chronic illness  - will need to evaluate if patient is up to date on age appropriate cancer screening with colonoscopy and Pap Hgb 11.1, MCV 76.6. Likely a mixed picture on chronic disease and iron deficiency.  - Iron/TIBC low suggesting iron deficiency anemia; can consider iron supplementation OP  - elevated ferritin likely in the setting on chronic illness  - patient is not up to date on age appropriate cancer screening with colonoscopy and Pap smear  - will need OP follow up

## 2022-09-20 NOTE — PROGRESS NOTE ADULT - PROBLEM SELECTOR PLAN 4
Na 125 on admission, improved to 126 after 2L fluids. Given hx, suspect hypovolemic hyponatremia.  - Na 125, serum osm 283. Utine Na < 20 and urine osm 416. All suggestive of hypovolemic hyponatremia  - BMP q12h >> goal correction 6-8 mEqs in 24hrs  - s/p 3L fluids given in ED with mild improvement in Na 126  - currently on D5-LR with improvement in Na  - once diet is resumed, will encourage PO intake Na 125 on admission, improved to 126 after 2L fluids. Given hx, suspect hypovolemic hyponatremia.  - Na 125, serum osm 283. Utine Na < 20 and urine osm 416. All suggestive of hypovolemic hyponatremia  - BMP q12h >> goal correction 6-8 mEqs in 24hrs  - s/p 3L fluids given in ED with mild improvement in Na 126  - s/p D5-LR with improvement in Na  - once diet is resumed, will encourage PO intake

## 2022-09-20 NOTE — DISCHARGE NOTE PROVIDER - NSDCMRMEDTOKEN_GEN_ALL_CORE_FT
aspirin 81 mg oral tablet: 1 tab(s) orally once a day  gabapentin 100 mg oral capsule: 1 cap(s) orally once a day (at bedtime)  metFORMIN 1000 mg oral tablet: 1 tab(s) orally 2 times a day  pantoprazole 40 mg oral delayed release tablet: 1 tab(s) orally once a day (before a meal)  polyethylene glycol 3350 oral powder for reconstitution: 17 gram(s) orally once a day  prasugrel 10 mg oral tablet: 1 tab(s) orally once a day  rosuvastatin 40 mg oral tablet: 1 tab(s) orally once a day  senna leaf extract oral tablet: 1 tab(s) orally once a day (at bedtime)  simethicone 80 mg oral tablet, chewable: 1 tab(s) orally once a day   simethicone 80 mg oral tablet, chewable: 1 tab(s) orally once a day   aspirin 81 mg oral tablet: 1 tab(s) orally once a day  gabapentin 100 mg oral capsule: 1 cap(s) orally once a day (at bedtime)  metFORMIN 1000 mg oral tablet: 1 tab(s) orally 2 times a day  pantoprazole 40 mg oral delayed release tablet: 1 tab(s) orally once a day (before a meal)  prasugrel 10 mg oral tablet: 1 tab(s) orally once a day  rosuvastatin 40 mg oral tablet: 1 tab(s) orally once a day  simethicone 80 mg oral tablet, chewable: 1 tab(s) orally once a day

## 2022-09-20 NOTE — CHART NOTE - NSCHARTNOTEFT_GEN_A_CORE
Contacted by primary team for dc recs    56 yo with hx t2dm arrives with euglycemic DKA    CAPILLARY BLOOD GLUCOSE  POCT Blood Glucose.: 199 mg/dL (20 Sep 2022 09:01)  POCT Blood Glucose.: 173 mg/dL (20 Sep 2022 05:11)  POCT Blood Glucose.: 172 mg/dL (19 Sep 2022 23:03)  POCT Blood Glucose.: 170 mg/dL (19 Sep 2022 17:33)  POCT Blood Glucose.: 163 mg/dL (19 Sep 2022 13:26)  POCT Blood Glucose.: 160 mg/dL (19 Sep 2022 12:34)      09-20    138  |  103  |  5<L>  ----------------------------<  186<H>  3.8   |  23  |  0.68    Ca    9.6      20 Sep 2022 10:30  Phos  2.2     09-20  Mg     1.90     09-20    TPro  7.0  /  Alb  2.8<L>  /  TBili  <0.2  /  DBili  x   /  AST  26  /  ALT  33  /  AlkPhos  114  09-19      Anion Gap, Serum: 12 mmol/L (09.20.22 @ 10:30)  Anion Gap, Serum: 7 mmol/L (09.20.22 @ 04:18)  Anion Gap, Serum: 17 mmol/L (09.18.22 @ 17:35)    Beta Hydroxy-Butyrate: <0.0 mmol/L (09.20.22 @ 10:30)  Beta Hydroxy-Butyrate: 1.1 mmol/L (09.20.22 @ 00:00)      DISCHARGE PLAN:  Please discharge on metformin 1000 mg po BID  Discontinue Jardiance.  Please inform patient to avoid all SGLT2’s in future.  Patient had urosepsis and DKA.  Both are potential side effects of this class of meds.    Patient should follow up with Dr. Dixie Velazquez for further escalation of care. Patient has [facility] medicaid which is not accepted at endocrine office or clinic  Please ensure patient has appointment scheduled  Please ensure patient has working glucometer, test strips, lancets, alcohol pads      Emmanuelle Villegas  Nurse Practitioner  Division of Endocrinology & Diabetes  Pager # [phone]      If after 6PM or before 9AM, or on weekends/holidays, please call endocrine answering service for assistance ([phone]).  For nonurgent matters email [email or web address] for assistance.
Reason for consult is euglycemic DKA    This is a 56 yo F /w a PMH of DM2 (a1c 7.7%) on jardiance who presents with n/V and abdominal pain. In ED patient had HAGMA /w pH on VBG 7.28 and FSG of 206 initially and ketones in the urine She was treated with IV fluids and admitted to medicine. Labs were repeated this evening which show worsening HAGMA but VBG with pH 7.43 and mixed metabolic acidosis and respiratory alkalosis. Beta hydroxybutyrate checked and elevated at 2.8.    Patient appears partially treated for euglycemic DKA, as she meets criteria on admission.     Would discuss with MICU about treatment of euglycemic DKA with IV insulin and q1h fingersticks until BHB normalizes, as this cant be managed on the regular medical floor due to frequent FSG monitoring.   Can aggressively hydrate with LR 200cc/hr while awaiting MICU evaluation and plan to repeat labs in 4 hours with BMP, beta-hydroxybutyrate, VBG.    Would discontinue jardiance on discharge given euglycmic DKA on presentation    Endocrine will continue to follow with full consult tomorrow AM    Sorin Mckeon MD  Endocrinology Fellow  Pager: [phone]

## 2022-09-20 NOTE — DIETITIAN INITIAL EVALUATION ADULT - PERTINENT MEDS FT
atorvastatin, dextrose 5% + lactated ringers IV Continuous, LANTUS 12U qAM, ADMELOG corrective regimen sliding scale, ADMELOG 3U TIDac, pantoprazole Tablet, polyethylene glycol, senna, simethicone

## 2022-09-20 NOTE — PROGRESS NOTE ADULT - PROBLEM SELECTOR PLAN 6
- Home: metformin 1000mg BID, jardiance 25mg qd  - elevated urine glucose likely in the setting of Jardiance use  - refer to problem 1

## 2022-09-20 NOTE — PROGRESS NOTE ADULT - PROBLEM SELECTOR PLAN 3
Pt describes liquids/solids getting stuck in her stomach, new; denies difficulty initiating swallowing reflex. Also endorsing symptoms of acid reflux. No hx of smoking/EtOH or GERD/h. pylori, significant weight loss. Likely 2/2 GERD vs gastroparesis although less likely with new diabetes dx and HbA1c 7.7.  - CT abdomen with small hiatal hernia which could also contribute to GERD and explain symptoms  - started on pantoprazole 40 QD  - NPO for now for euglycemia DKA treatment but will resume diet once patient is stable from eDKA standpoint Pt describes liquids/solids getting stuck in her stomach, new; denies difficulty initiating swallowing reflex. Also endorsing symptoms of acid reflux. No hx of smoking/EtOH or GERD/h. pylori, significant weight loss. Likely 2/2 GERD vs gastroparesis although less likely with new diabetes dx and HbA1c 7.7.  - CT abdomen with small hiatal hernia which could also contribute to GERD and explain symptoms  - started on pantoprazole 40 QD  - encourage soft diet

## 2022-09-20 NOTE — DISCHARGE NOTE PROVIDER - NSFOLLOWUPCLINICS_GEN_ALL_ED_FT
[facility]  Gastroenterology  [address] [place] [address]  Phone: [phone]  Fax:      [facility]  Gastroenterology  [address] [place] [address]  Phone: [phone]  Fax:     [facility] Gynecology and Obstetrics  Gynceology/OB  [address] [place] [address]  Phone: [phone]  Fax:

## 2022-09-20 NOTE — PROGRESS NOTE ADULT - ASSESSMENT
57F PMH DM, CAD (s/p 2 stents placed at [place] last month) c/o chills, epigastric discomfort x1 day with associated vomiting (x2 episodes, NBNB) and dysphagia. Admitted for urosepsis, c/b hyponatremia.

## 2022-09-21 VITALS
HEART RATE: 76 BPM | SYSTOLIC BLOOD PRESSURE: 122 MMHG | RESPIRATION RATE: 17 BRPM | DIASTOLIC BLOOD PRESSURE: 67 MMHG | TEMPERATURE: 99 F | OXYGEN SATURATION: 100 %

## 2022-09-21 LAB
-  AMIKACIN: SIGNIFICANT CHANGE UP
-  AMOXICILLIN/CLAVULANIC ACID: SIGNIFICANT CHANGE UP
-  AMPICILLIN/SULBACTAM: SIGNIFICANT CHANGE UP
-  AMPICILLIN: SIGNIFICANT CHANGE UP
-  AZTREONAM: SIGNIFICANT CHANGE UP
-  CEFAZOLIN: SIGNIFICANT CHANGE UP
-  CEFEPIME: SIGNIFICANT CHANGE UP
-  CEFOXITIN: SIGNIFICANT CHANGE UP
-  CEFTRIAXONE: SIGNIFICANT CHANGE UP
-  CIPROFLOXACIN: SIGNIFICANT CHANGE UP
-  ERTAPENEM: SIGNIFICANT CHANGE UP
-  GENTAMICIN: SIGNIFICANT CHANGE UP
-  IMIPENEM: SIGNIFICANT CHANGE UP
-  LEVOFLOXACIN: SIGNIFICANT CHANGE UP
-  MEROPENEM: SIGNIFICANT CHANGE UP
-  NITROFURANTOIN: SIGNIFICANT CHANGE UP
-  PIPERACILLIN/TAZOBACTAM: SIGNIFICANT CHANGE UP
-  TIGECYCLINE: SIGNIFICANT CHANGE UP
-  TOBRAMYCIN: SIGNIFICANT CHANGE UP
-  TRIMETHOPRIM/SULFAMETHOXAZOLE: SIGNIFICANT CHANGE UP
ALBUMIN SERPL ELPH-MCNC: 3.2 G/DL — LOW (ref 3.3–5)
ALP SERPL-CCNC: 116 U/L — SIGNIFICANT CHANGE UP (ref 40–120)
ALT FLD-CCNC: 29 U/L — SIGNIFICANT CHANGE UP (ref 4–33)
ANION GAP SERPL CALC-SCNC: 12 MMOL/L — SIGNIFICANT CHANGE UP (ref 7–14)
AST SERPL-CCNC: 15 U/L — SIGNIFICANT CHANGE UP (ref 4–32)
BASOPHILS # BLD AUTO: 0.01 K/UL — SIGNIFICANT CHANGE UP (ref 0–0.2)
BASOPHILS NFR BLD AUTO: 0.1 % — SIGNIFICANT CHANGE UP (ref 0–2)
BILIRUB SERPL-MCNC: <0.2 MG/DL — SIGNIFICANT CHANGE UP (ref 0.2–1.2)
BUN SERPL-MCNC: 10 MG/DL — SIGNIFICANT CHANGE UP (ref 7–23)
CALCIUM SERPL-MCNC: 9.5 MG/DL — SIGNIFICANT CHANGE UP (ref 8.4–10.5)
CHLORIDE SERPL-SCNC: 100 MMOL/L — SIGNIFICANT CHANGE UP (ref 98–107)
CO2 SERPL-SCNC: 24 MMOL/L — SIGNIFICANT CHANGE UP (ref 22–31)
CREAT SERPL-MCNC: 0.67 MG/DL — SIGNIFICANT CHANGE UP (ref 0.5–1.3)
CULTURE RESULTS: SIGNIFICANT CHANGE UP
EGFR: 101 ML/MIN/1.73M2 — SIGNIFICANT CHANGE UP
EOSINOPHIL # BLD AUTO: 0.1 K/UL — SIGNIFICANT CHANGE UP (ref 0–0.5)
EOSINOPHIL NFR BLD AUTO: 1 % — SIGNIFICANT CHANGE UP (ref 0–6)
GLUCOSE BLDC GLUCOMTR-MCNC: 166 MG/DL — HIGH (ref 70–99)
GLUCOSE BLDC GLUCOMTR-MCNC: 182 MG/DL — HIGH (ref 70–99)
GLUCOSE BLDC GLUCOMTR-MCNC: 206 MG/DL — HIGH (ref 70–99)
GLUCOSE SERPL-MCNC: 145 MG/DL — HIGH (ref 70–99)
HCT VFR BLD CALC: 30.6 % — LOW (ref 34.5–45)
HGB BLD-MCNC: 9.6 G/DL — LOW (ref 11.5–15.5)
IANC: 6.03 K/UL — SIGNIFICANT CHANGE UP (ref 1.8–7.4)
IMM GRANULOCYTES NFR BLD AUTO: 0.6 % — SIGNIFICANT CHANGE UP (ref 0–0.9)
LYMPHOCYTES # BLD AUTO: 2.75 K/UL — SIGNIFICANT CHANGE UP (ref 1–3.3)
LYMPHOCYTES # BLD AUTO: 27.2 % — SIGNIFICANT CHANGE UP (ref 13–44)
MAGNESIUM SERPL-MCNC: 1.9 MG/DL — SIGNIFICANT CHANGE UP (ref 1.6–2.6)
MCHC RBC-ENTMCNC: 24.2 PG — LOW (ref 27–34)
MCHC RBC-ENTMCNC: 31.4 GM/DL — LOW (ref 32–36)
MCV RBC AUTO: 77.1 FL — LOW (ref 80–100)
METHOD TYPE: SIGNIFICANT CHANGE UP
MONOCYTES # BLD AUTO: 1.16 K/UL — HIGH (ref 0–0.9)
MONOCYTES NFR BLD AUTO: 11.5 % — SIGNIFICANT CHANGE UP (ref 2–14)
NEUTROPHILS # BLD AUTO: 6.03 K/UL — SIGNIFICANT CHANGE UP (ref 1.8–7.4)
NEUTROPHILS NFR BLD AUTO: 59.6 % — SIGNIFICANT CHANGE UP (ref 43–77)
NRBC # BLD: 0 /100 WBCS — SIGNIFICANT CHANGE UP (ref 0–0)
NRBC # FLD: 0 K/UL — SIGNIFICANT CHANGE UP (ref 0–0)
ORGANISM # SPEC MICROSCOPIC CNT: SIGNIFICANT CHANGE UP
ORGANISM # SPEC MICROSCOPIC CNT: SIGNIFICANT CHANGE UP
PHOSPHATE SERPL-MCNC: 2.9 MG/DL — SIGNIFICANT CHANGE UP (ref 2.5–4.5)
PLATELET # BLD AUTO: 338 K/UL — SIGNIFICANT CHANGE UP (ref 150–400)
POTASSIUM SERPL-MCNC: 3.9 MMOL/L — SIGNIFICANT CHANGE UP (ref 3.5–5.3)
POTASSIUM SERPL-SCNC: 3.9 MMOL/L — SIGNIFICANT CHANGE UP (ref 3.5–5.3)
PROT SERPL-MCNC: 6.2 G/DL — SIGNIFICANT CHANGE UP (ref 6–8.3)
RBC # BLD: 3.97 M/UL — SIGNIFICANT CHANGE UP (ref 3.8–5.2)
RBC # FLD: 14.1 % — SIGNIFICANT CHANGE UP (ref 10.3–14.5)
SODIUM SERPL-SCNC: 136 MMOL/L — SIGNIFICANT CHANGE UP (ref 135–145)
SPECIMEN SOURCE: SIGNIFICANT CHANGE UP
WBC # BLD: 10.11 K/UL — SIGNIFICANT CHANGE UP (ref 3.8–10.5)
WBC # FLD AUTO: 10.11 K/UL — SIGNIFICANT CHANGE UP (ref 3.8–10.5)

## 2022-09-21 PROCEDURE — 99239 HOSP IP/OBS DSCHRG MGMT >30: CPT | Mod: GC

## 2022-09-21 RX ORDER — ROSUVASTATIN CALCIUM 5 MG/1
1 TABLET ORAL
Qty: 0 | Refills: 0 | DISCHARGE

## 2022-09-21 RX ORDER — ASPIRIN/CALCIUM CARB/MAGNESIUM 324 MG
1 TABLET ORAL
Qty: 0 | Refills: 0 | DISCHARGE

## 2022-09-21 RX ORDER — GABAPENTIN 400 MG/1
1 CAPSULE ORAL
Qty: 0 | Refills: 0 | DISCHARGE

## 2022-09-21 RX ORDER — ASPIRIN/CALCIUM CARB/MAGNESIUM 324 MG
1 TABLET ORAL
Qty: 30 | Refills: 0
Start: 2022-09-21 | End: 2022-10-20

## 2022-09-21 RX ORDER — ROSUVASTATIN CALCIUM 5 MG/1
1 TABLET ORAL
Qty: 30 | Refills: 0
Start: 2022-09-21 | End: 2022-10-20

## 2022-09-21 RX ORDER — PANTOPRAZOLE SODIUM 20 MG/1
1 TABLET, DELAYED RELEASE ORAL
Qty: 30 | Refills: 0
Start: 2022-09-21 | End: 2022-10-20

## 2022-09-21 RX ORDER — METFORMIN HYDROCHLORIDE 850 MG/1
1 TABLET ORAL
Qty: 0 | Refills: 0 | DISCHARGE

## 2022-09-21 RX ORDER — PRASUGREL 5 MG/1
1 TABLET, FILM COATED ORAL
Qty: 0 | Refills: 0 | DISCHARGE

## 2022-09-21 RX ORDER — GABAPENTIN 400 MG/1
1 CAPSULE ORAL
Qty: 30 | Refills: 0
Start: 2022-09-21 | End: 2022-10-20

## 2022-09-21 RX ORDER — METFORMIN HYDROCHLORIDE 850 MG/1
1 TABLET ORAL
Qty: 60 | Refills: 0
Start: 2022-09-21 | End: 2022-10-20

## 2022-09-21 RX ORDER — PRASUGREL 5 MG/1
1 TABLET, FILM COATED ORAL
Qty: 30 | Refills: 0
Start: 2022-09-21 | End: 2022-10-20

## 2022-09-21 RX ADMIN — Medication 2: at 17:59

## 2022-09-21 RX ADMIN — Medication 3 UNIT(S): at 13:04

## 2022-09-21 RX ADMIN — Medication 1: at 09:13

## 2022-09-21 RX ADMIN — Medication 650 MILLIGRAM(S): at 12:02

## 2022-09-21 RX ADMIN — Medication 650 MILLIGRAM(S): at 13:00

## 2022-09-21 RX ADMIN — PRASUGREL 10 MILLIGRAM(S): 5 TABLET, FILM COATED ORAL at 11:59

## 2022-09-21 RX ADMIN — PANTOPRAZOLE SODIUM 40 MILLIGRAM(S): 20 TABLET, DELAYED RELEASE ORAL at 06:29

## 2022-09-21 RX ADMIN — Medication 3 UNIT(S): at 17:59

## 2022-09-21 RX ADMIN — Medication 81 MILLIGRAM(S): at 11:59

## 2022-09-21 RX ADMIN — INSULIN GLARGINE 12 UNIT(S): 100 INJECTION, SOLUTION SUBCUTANEOUS at 09:12

## 2022-09-21 RX ADMIN — Medication 1: at 13:05

## 2022-09-21 RX ADMIN — Medication 3 UNIT(S): at 09:13

## 2022-09-21 NOTE — PROGRESS NOTE ADULT - PROBLEM SELECTOR PLAN 3
Pt describes liquids/solids getting stuck in her stomach, new; denies difficulty initiating swallowing reflex. Also endorsing symptoms of acid reflux. No hx of smoking/EtOH or GERD/h. pylori, significant weight loss. Likely 2/2 GERD vs gastroparesis although less likely with new diabetes dx and HbA1c 7.7.  - CT abdomen with small hiatal hernia which could also contribute to GERD and explain symptoms  - started on pantoprazole 40 QD  - encourage soft diet

## 2022-09-21 NOTE — PROGRESS NOTE ADULT - PROBLEM SELECTOR PLAN 7
- PCI 1 month ago at [place] - 2 stents placed (NSTEMI)  - C/w asa/statin  - C/w prasugrel 10mg qd

## 2022-09-21 NOTE — PROGRESS NOTE ADULT - ASSESSMENT
57F PMH DM, CAD (s/p 2 stents placed at [place] [facility] last month) c/o chills, epigastric discomfort x1 day with associated vomiting (x2 episodes, NBNB) and dysphagia. Admitted for urosepsis, c/b hyponatremia.

## 2022-09-21 NOTE — DISCHARGE NOTE NURSING/CASE MANAGEMENT/SOCIAL WORK - PATIENT PORTAL LINK FT
You can access the FollowMyHealth Patient Portal offered by [facility] by registering at the following website: [email or web address]. By joining [employer]’s FollowMyHealth portal, you will also be able to view your health information using other applications (apps) compatible with our system.

## 2022-09-21 NOTE — PROGRESS NOTE ADULT - SUBJECTIVE AND OBJECTIVE BOX
Katrina Owen  PGY-1 Resident Physician   Pager [phone]/ [phone]    Patient is a 57y old  Female who presents with a chief complaint of UTI (20 Sep 2022 13:21)      SUBJECTIVE / OVERNIGHT EVENTS:  Patient seen and evaluated at bedside.    Denies any fevers, chills, CP, or SOB.    Vital Signs Last 24 Hrs  T(C): 36.7 (21 Sep 2022 05:19), Max: 37.2 (20 Sep 2022 21:30)  T(F): 98 (21 Sep 2022 05:19), Max: 98.9 (20 Sep 2022 21:30)  HR: 77 (21 Sep 2022 05:19) (72 - 77)  BP: 131/81 (21 Sep 2022 05:19) (125/60 - 145/71)  BP(mean): --  RR: 16 (21 Sep 2022 05:19) (16 - 18)  SpO2: 100% (21 Sep 2022 05:19) (100% - 100%)    Parameters below as of 21 Sep 2022 05:19  Patient On (Oxygen Delivery Method): room air        PHYSICAL EXAM:  GENERAL: NAD, well-developed  CHEST/LUNG: Clear to auscultation bilaterally; No wheeze  HEART: Regular rate and rhythm; Normal S1 S2, No murmurs, rubs, or gallops  ABDOMEN: Soft, Nontender, Nondistended; Bowel sounds present  EXTREMITIES:  2+ Peripheral Pulses, No clubbing, cyanosis, or edema  PSYCH: AAOx3    LABS:                        10.2   9.23  )-----------( 285      ( 20 Sep 2022 04:18 )             31.5     Hgb Trend: 10.2<--, 10.3<--, 10.1<--, 11.1<--  09-20    138  |  103  |  5<L>  ----------------------------<  186<H>  3.8   |  23  |  0.68    Ca    9.6      20 Sep 2022 10:30  Phos  2.2     09-20  Mg     1.90     09-20      Creatinine Trend: 0.68<--, 0.58<--, 0.57<--, 0.57<--, 0.56<--, 0.58<--           Katrina Owen  PGY-1 Resident Physician   Pager [phone]/ [phone]    Patient is a 57y old  Female who presents with a chief complaint of UTI (20 Sep 2022 13:21)      SUBJECTIVE / OVERNIGHT EVENTS:  Patient seen and evaluated at bedside.  Patient with no concerns this morning   Denies any fevers, chills, CP, or SOB.    Vital Signs Last 24 Hrs  T(C): 36.7 (21 Sep 2022 05:19), Max: 37.2 (20 Sep 2022 21:30)  T(F): 98 (21 Sep 2022 05:19), Max: 98.9 (20 Sep 2022 21:30)  HR: 77 (21 Sep 2022 05:19) (72 - 77)  BP: 131/81 (21 Sep 2022 05:19) (125/60 - 145/71)  BP(mean): --  RR: 16 (21 Sep 2022 05:19) (16 - 18)  SpO2: 100% (21 Sep 2022 05:19) (100% - 100%)    Parameters below as of 21 Sep 2022 05:19  Patient On (Oxygen Delivery Method): room air        PHYSICAL EXAM:  GENERAL: NAD, well-developed  CHEST/LUNG: Clear to auscultation bilaterally; No wheeze  HEART: Regular rate and rhythm; Normal S1 S2, No murmurs, rubs, or gallops  ABDOMEN: Soft, Nontender, Nondistended; Bowel sounds present  EXTREMITIES:  2+ Peripheral Pulses, No clubbing, cyanosis, or edema  PSYCH: AAOx3    LABS:                        10.2   9.23  )-----------( 285      ( 20 Sep 2022 04:18 )             31.5     Hgb Trend: 10.2<--, 10.3<--, 10.1<--, 11.1<--  09-20    138  |  103  |  5<L>  ----------------------------<  186<H>  3.8   |  23  |  0.68    Ca    9.6      20 Sep 2022 10:30  Phos  2.2     09-20  Mg     1.90     09-20      Creatinine Trend: 0.68<--, 0.58<--, 0.57<--, 0.57<--, 0.56<--, 0.58<--           Katrina Owen  PGY-1 Resident Physician   Pager [phone]/ [phone]    Patient is a 57y old  Female who presents with a chief complaint of UTI (20 Sep 2022 13:21)      SUBJECTIVE / OVERNIGHT EVENTS:  Patient seen and evaluated at bedside.  Patient with no concerns this morning. Has not had any episodes of diarrhea overnight. States she wants to go home today.  Denies any fevers, chills, CP, or SOB.    Vital Signs Last 24 Hrs  T(C): 36.7 (21 Sep 2022 05:19), Max: 37.2 (20 Sep 2022 21:30)  T(F): 98 (21 Sep 2022 05:19), Max: 98.9 (20 Sep 2022 21:30)  HR: 77 (21 Sep 2022 05:19) (72 - 77)  BP: 131/81 (21 Sep 2022 05:19) (125/60 - 145/71)  BP(mean): --  RR: 16 (21 Sep 2022 05:19) (16 - 18)  SpO2: 100% (21 Sep 2022 05:19) (100% - 100%)    Parameters below as of 21 Sep 2022 05:19  Patient On (Oxygen Delivery Method): room air        PHYSICAL EXAM:  GENERAL: NAD, well-developed  CHEST/LUNG: Clear to auscultation bilaterally; No wheeze  HEART: Regular rate and rhythm; Normal S1 S2, No murmurs, rubs, or gallops  ABDOMEN: Soft, Nontender, Nondistended; Bowel sounds present  EXTREMITIES:  2+ Peripheral Pulses, No clubbing, cyanosis, or edema  PSYCH: AAOx3    LABS:                        10.2   9.23  )-----------( 285      ( 20 Sep 2022 04:18 )             31.5     Hgb Trend: 10.2<--, 10.3<--, 10.1<--, 11.1<--  09-20    138  |  103  |  5<L>  ----------------------------<  186<H>  3.8   |  23  |  0.68    Ca    9.6      20 Sep 2022 10:30  Phos  2.2     09-20  Mg     1.90     09-20      Creatinine Trend: 0.68<--, 0.58<--, 0.57<--, 0.57<--, 0.56<--, 0.58<--

## 2022-09-21 NOTE — PROGRESS NOTE ADULT - PROBLEM SELECTOR PLAN 1
Patient with AG 21 on presentation and pH 7.31 likely in the setting of starvation ketosis 2/2 decreased PO intake vs euglycemic DKA in setting of Jardiance use.  - urine ketones elevated on presentation initially thought to be in the setting of starvation ketosis with lower suspicion for euglycemic DKA. BHB elevated 2.8 raising suspicion for eDKA.  - overnight, AG closed and bicarb > 18 and patient was started on a CC diet with 3u admelog premeal  - lantus increased from 10U to 12U per endo recs  - c/w SSI premeal   - will discharge on metformin and discontinue jardiance  - appreciate endo recs

## 2022-09-21 NOTE — PROGRESS NOTE ADULT - PROBLEM SELECTOR PLAN 5
Hgb 11.1, MCV 76.6. Likely a mixed picture on chronic disease and iron deficiency.  - Iron/TIBC low suggesting iron deficiency anemia; can consider iron supplementation OP  - elevated ferritin likely in the setting on chronic illness  - patient is not up to date on age appropriate cancer screening with colonoscopy and Pap smear  - will need OP follow up

## 2022-09-21 NOTE — DISCHARGE NOTE NURSING/CASE MANAGEMENT/SOCIAL WORK - NSDCPEFALRISK_GEN_ALL_CORE
For information on Fall & Injury Prevention, visit: [email or web address] OR  [email or web address] OR  https://www.cdc.gov/steadi/patient.html

## 2022-09-21 NOTE — PROGRESS NOTE ADULT - ATTENDING COMMENTS
57F with PMH of DM2, CAD s/p stent x2 (Aug 2022) who presents w/ epigastric pain/fullness w/ nausea/vomiting and fullness. Found to have positive UA along w/ urinary symptoms. Febrile 100.5F and w/ leukocytosis. Labs also notable for hyponatremia, HAGMA w/ +urine ketones. Admit for sepsis 2' UTI. Started on abx. HAGMA persisted, follow up labs show elevated BHB. Bicarb remain low. Given hx/clinical picture, pt likely in euglycemic DKA. MICU c/s - not a candidate. Endo on board. Clinically improved w/ intervention. Gap closed. Glycemic control remains optimal. Dispo planning.    Pt seen and evaluated at bedside this AM. No o/n events. Diarrhea resolved. Labs stable.     #Diarrhea  -Likely 2' laxatives. Now resolved.    #euglycemic DKA  -Gap closed. Tolerating PO. Did well w/ SQ insulin.  -Dispo recommendations from endo appreciated.     #Sepsis 2' E Coli UTI  -WBC resolved. No new fever. Completed CTX x3.     #DM2  -CHO diet.  -Metformin on DC. Appreciate endo recs.  -DC jardiance - do not resume.    #CAD s/p stents x2  -Stable. Cont home meds.    #Dysphagia  -Resolved. CT A/P reviewed - only notable for bladder wall thickening. Rest of read per report.   -Cont PPI.   -Further f/u as OP. Patient will need GI follow up for CRC screening      Pt med stable for DC home. 35 min spent preparing DC, counseling pt, and coordination of care.

## 2022-09-21 NOTE — PROGRESS NOTE ADULT - PROBLEM SELECTOR PLAN 4
Na 125 on admission, improved to 126 after 2L fluids. Given hx, suspect hypovolemic hyponatremia.  - Na 125, serum osm 283. Utine Na < 20 and urine osm 416. All suggestive of hypovolemic hyponatremia  - BMP q12h >> goal correction 6-8 mEqs in 24hrs  - s/p 3L fluids given in ED with mild improvement in Na 126  - s/p D5-LR with improvement in Na  - once diet is resumed, will encourage PO intake

## 2022-09-21 NOTE — PROGRESS NOTE ADULT - PROBLEM SELECTOR PLAN 2
Temp 100.5, WBC 14.5 + positive UA. No hx of UTIs previously. No other source suspected.  - last day of ceftriaxone x 3 days (9/18-9/20)  - urine culture positive for E coli  - procal elevated suggesting bacterial etiology consistent with UTI  - blood cultures NGTD  - CXR clear lungs

## 2022-09-23 LAB
CULTURE RESULTS: SIGNIFICANT CHANGE UP
CULTURE RESULTS: SIGNIFICANT CHANGE UP
SPECIMEN SOURCE: SIGNIFICANT CHANGE UP
SPECIMEN SOURCE: SIGNIFICANT CHANGE UP

## 2023-01-16 NOTE — ED PROVIDER NOTE - EKG ADDITIONAL QUESTION - PERFORMED INDEPENDENT VISUALIZATION
Learning About Sleeping Well  What does sleeping well mean? Sleeping well means getting enough sleep. How much sleep is enough varies among people. The number of hours you sleep is not as important as how you feel when you wake up. If you do not feel refreshed, you probably need more sleep. Another sign of not getting enough sleep is feeling tired during the day. The average total nightly sleep time is 7½ to 8 hours. Healthy adults may need a little more or a little less than this. Why is getting enough sleep important? Getting enough quality sleep is a basic part of good health. When your sleep suffers, your mood and your thoughts can suffer too. You may find yourself feeling more grumpy or stressed. Not getting enough sleep also can lead to serious problems, including injury, accidents, anxiety, and depression. What might cause poor sleeping? Many things can cause sleep problems, including:  Stress. Stress can be caused by fear about a single event, such as giving a speech. Or you may have ongoing stress, such as worry about work or school. Depression, anxiety, and other mental or emotional conditions. Changes in your sleep habits or surroundings. This includes changes that happen where you sleep, such as noise, light, or sleeping in a different bed. It also includes changes in your sleep pattern, such as having jet lag or working a late shift. Health problems, such as pain, breathing problems, and restless legs syndrome. Lack of regular exercise. How can you help yourself? Here are some tips that may help you sleep more soundly and wake up feeling more refreshed. Your sleeping area  Use your bedroom only for sleeping and sex. A bit of light reading may help you fall asleep. But if it doesn't, do your reading elsewhere in the house. Don't watch TV in bed. Be sure your bed is big enough to stretch out comfortably, especially if you have a sleep partner. Keep your bedroom quiet, dark, and cool. Use curtains, blinds, or a sleep mask to block out light. To block out noise, use earplugs, soothing music, or a \"white noise\" machine. Your evening and bedtime routine  Get regular exercise, but not within 3 to 4 hours before your bedtime. Create a relaxing bedtime routine. You might want to take a warm shower or bath, listen to soothing music, or drink a cup of noncaffeinated tea. Go to bed at the same time every night. And get up at the same time every morning, even if you feel tired. What to avoid  Limit caffeine (coffee, tea, caffeinated sodas) during the day, and don't have any for at least 4 to 6 hours before bedtime. Don't drink alcohol before bedtime. Alcohol can cause you to wake up more often during the night. Don't smoke or use tobacco, especially in the evening. Nicotine can keep you awake. Don't take naps during the day, especially close to bedtime. Don't lie in bed awake for too long. If you can't fall asleep, or if you wake up in the middle of the night and can't get back to sleep within 15 minutes or so, get out of bed and go to another room until you feel sleepy. Don't take medicine that may keep you awake, or make you feel hyper or energized, right before bed. Your doctor can tell you if your medicine may do this and if you can take it earlier in the day. If you can't sleep  Imagine yourself in a peaceful, pleasant scene. Focus on the details and feelings of being in a place that is relaxing. Get up and do a quiet or boring activity until you feel sleepy. Don't drink any liquids after 6 p.m. if you wake up often because you have to go to the bathroom. Where can you learn more? Go to [employer]  Enter X541 in the search box to learn more about \"Learning About Sleeping Well. \"   © 5804-5368 Healthwise, Incorporated. Care instructions adapted under license by [employer] (which disclaims liability or warranty for this information).  This care instruction is for use with your licensed healthcare professional. If you have questions about a medical condition or this instruction, always ask your healthcare professional. [address] any warranty or liability for your use of this information. Content Version: 8.8.26321; Last Revised: March 18, 2011          Insomnia: After Your Visit  Your Care Instructions  Insomnia is the inability to sleep well. It is a common problem for most people at some time. Insomnia may make it hard for you to get to sleep, stay asleep, or sleep as long as you need to. This can make you tired and grouchy during the day. It can also make you forgetful, less effective at work, and unhappy. Insomnia can be caused by conditions such as depression or anxiety. Pain can also affect your ability to sleep. When these problems are solved, the insomnia usually clears up. But sometimes bad sleep habits can cause insomnia. If insomnia is affecting your work or your enjoyment of life, you can take steps to improve your sleep. Follow-up care is a key part of your treatment and safety. Be sure to make and go to all appointments, and call your doctor if you are having problems. Its also a good idea to know your test results and keep a list of the medicines you take. How can you care for yourself at home? What to avoid  Do not have drinks with caffeine, such as coffee or black tea, for 8 hours before bed. Do not smoke or use other types of tobacco near bedtime. Nicotine is a stimulant and can keep you awake. Avoid drinking alcohol late in the evening, because it can cause you to wake in the middle of the night. Do not eat a big meal close to bedtime. If you are hungry, eat a light snack. Do not drink a lot of water close to bedtime, because the need to urinate may wake you up during the night. Do not read or watch TV in bed. Use the bed only for sleeping and sexual activity.   What to try  Go to bed at the same time every night, and wake up at the same time every morning. Do not take naps during the day. Keep your bedroom quiet, dark, and cool. Get regular exercise, but not within 3 to 4 hours of your bedtime. .   Sleep on a comfortable pillow and mattress. If watching the clock makes you anxious, turn it facing away from you so you cannot see the time. If you worry when you lie down, start a worry book. Well before bedtime, write down your worries, and then set the book and your concerns aside. Try meditation or other relaxation techniques before you go to bed. If you cannot fall asleep after 15 minutes, get up and go to another room until you feel sleepy. Do something relaxing. Repeat your bedtime routine before you go to bed again. Repeat this as often as necessary. Make your house quiet and calm about an hour before bedtime. Turn down the lights, turn off the TV, log off the computer, and turn down the volume on music. This can help you relax after a busy day. When should you call for help? Watch closely for changes in your health, and be sure to contact your doctor if:  Your efforts to improve your sleep do not work. Your insomnia gets worse. You fall asleep during the day. Where can you learn more? Go to [employer]  Enter P513 in the search box to learn more about \"Insomnia: After Your Visit. \"   © 2599-6229 Healthwise, Incorporated. Care instructions adapted under license by [facility] [employer] (which disclaims liability or warranty for this information). This care instruction is for use with your licensed healthcare professional. If you have questions about a medical condition or this instruction, always ask your healthcare professional. [place] [address] any warranty or liability for your use of this information.   Content Version: 7.3.64235; Last Revised: June 26, 2010 Yes